# Patient Record
Sex: FEMALE | Race: WHITE | NOT HISPANIC OR LATINO | Employment: FULL TIME | URBAN - METROPOLITAN AREA
[De-identification: names, ages, dates, MRNs, and addresses within clinical notes are randomized per-mention and may not be internally consistent; named-entity substitution may affect disease eponyms.]

---

## 2022-03-18 PROBLEM — R60.0 EDEMA OF BOTH LOWER EXTREMITIES: Status: ACTIVE | Noted: 2022-03-18

## 2022-03-18 PROBLEM — IMO0002 TYPE II DIABETES MELLITUS, UNCONTROLLED: Status: ACTIVE | Noted: 2022-03-18

## 2022-03-18 PROBLEM — I10 ESSENTIAL HYPERTENSION, BENIGN: Status: ACTIVE | Noted: 2022-03-18

## 2022-03-18 PROBLEM — E78.00 HYPERCHOLESTEROLEMIA: Status: ACTIVE | Noted: 2022-03-18

## 2022-03-18 PROBLEM — E55.9 VITAMIN D DEFICIENCY: Status: ACTIVE | Noted: 2022-03-18

## 2022-03-31 ENCOUNTER — TELEPHONE (OUTPATIENT)
Dept: BARIATRICS/WEIGHT MGMT | Facility: CLINIC | Age: 37
End: 2022-03-31

## 2022-04-04 PROBLEM — IMO0002 TYPE II DIABETES MELLITUS, UNCONTROLLED: Status: RESOLVED | Noted: 2022-03-18 | Resolved: 2022-04-04

## 2022-04-04 PROBLEM — E11.65 TYPE 2 DIABETES MELLITUS WITH HYPERGLYCEMIA: Status: ACTIVE | Noted: 2022-04-04

## 2022-04-04 NOTE — PROGRESS NOTES
Sienna Jamie, 36 y.o., Gender: female    Assessment/Plan    1.  Pt with DM Type 2 uncontrolled w/o complications.  A1c 11.1% 3/22.  Counseled that conservative goal A1c is to be <7 % and aggressive 6.5 %.  Counseled pt that overall risk with diabetes is related to long term complications of both small and large blood vessels and that the risk for CAD, MI, and stroke is much higher than general population.  Counseled pt on long term effects of prolonged poor control of diabetes.  Counseled that the goal of tx is prevention of further complications.  Counseled on lifestyle change as a key part of this process to improve all parameters related to diabetes.  Pt counseled on how to do freq bg checks with fasting AM, before meals, before bed, occasionally 2 hours after a meal, and at 2-3 am if awake.  Pt is not checking enough as directed based on current treatment plan to check 4-6 times a day.  Pt says lost last meter some time ago.  Sent for new meter and will also try for Margie device per pt request.  Pt reports did not tolerate Metformin or GLP-1 class of tx.  Pt has never had SGLT2 in the past.  Will stop Glucotrol.  Rec Actos at 30 mg as that has been rec max dose since around '10.  Pt counseled to start Lantus at 15 units every morning and was given forced titration sheet to self adjust to get to goal of fasting blood glucose <130 mg/dL.  Pt was counseled on how to use short acting analog for correction coverage, meal, and bedtime coverage.  Also have a standing meal dose of 5,5,5 units.  Will get labs to assess pancreatic reserve which will help determine further tx approach.  Will get further diet education. Will have f/u labs before pt returns.  Counseled must keep log and bring to apts to cont tx plan.  Will work to optimize treatment plan in coming months and get back to PCP.  2.  Counseled on hypoglycemia as pt is on insulin.  Counseled about need to monitor bg related to driving as public safety concern.   Also counseled on proper use of glucagon emergency kit.  Pt reported full understanding.  3.  Hypertension goal of <140/90.  Pt on tx for renal protection.  Last microalbumin/creatinine ratio urine check not known so will get with next labs.  4.  Hyperlipidemia LDL goal <100 and trig goal <150.  Pt on tx and as no lab data provided rec cont pending lab update.  Will have f/u labs.    j  5.  Pt w/ hx of Vit D def.  No lab provided will update prior to return.  6.  Counseled to have eye exam updated as is past due.        Time Counseled: 25  Minutes  Total Time: 45  Minutes    Return to office in:  3-4   Months      Random Glucose: 182 mg/dL      HPI Summary    Pt is here for evaluation of DM reported to be Type 2.  Pt reports DM since '13 with 2nd pregnancy requiring insulin then got off insulin after delivery and has been on other tx since that time.  Pt reports blood glucoses are not known without record as lost meter.  Pt reports weight has been stable.  Pt has report of fatigue.  Pt has complaint of general muscle weakness.  Pt reports increased thirst and urination.  Pt denies any chest pain.  Pt reports shortness of breath w/ activity.  Pt denies any abdominal complaints at this time, but that was issue with prior tx items for DM.  Pt reports when eats she gets GI upset at times.  Pt reports occ blurry vision.  Pt reports last seen by eye doctor 12/20 no retinopathy.  Pt reports no problems with feet.  Pt denies any skin wounds.  Pt has report of depression and other mood disorder is on tx.  Pt reports poor sleep quality with known apnea symptoms on cpap.  Pt also maintains abnormal wake / sleep cycle related to working nights.  Pt reports trying to follow a diet to lose weight and no exercise.  Pt reports having pneumonia vaccine in the past and flu shot this last season.  Last education class was around time of dx pt asked for further edu on diet.  Pt without any other complaints related to diabetes at this  time.    Review of Systems  see HPI      Patient History    Past History (reviewed):    Medical:   Past Medical History:   Diagnosis Date   • ADHD (attention deficit hyperactivity disorder)    • Anxiety    • Bowel dysfunction     freq diarrhea   • Chronic fatigue    • Depression    • Edema of both lower extremities    • Endometriosis    • Essential hypertension, benign    • Gestational diabetes 2010    with 1st pregnancy, diet tx only   • Insomnia    • Migraine    • Mixed hyperlipidemia    • Morbidly obese (HCC)    • OCD (obsessive compulsive disorder)    • SERENA (obstructive sleep apnea)    • Ovarian cyst    • Reactive airway disease    • Type 2 diabetes mellitus with hyperglycemia (HCC) 2013    with 2nd pregnancy, insulin required   • Uterine fibroid    • Vitamin D deficiency        Surgery:   Past Surgical History:   Procedure Laterality Date   • CHOLECYSTECTOMY     • CYST REMOVAL      from chin area   • FULGURATION ENDOMETRIOSIS     • OVARIAN CYST REMOVAL     • UTERINE FIBROID SURGERY     • WISDOM TOOTH EXTRACTION           Social History (reviewed):  - Smoking, very rare ETOH, - Drugs,  3 children, 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    Allergies:    Allergies   Allergen Reactions   • Lisinopril Other (See Comments)     Patient states that she experiences heart palpations    • Penicillins Hives     Patient states that she gets hives    • Promethazine Nausea And Vomiting     Patient states that she experiences increased vomiting        Physical Exam    VITALS:    Vitals:    04/11/22 0906   BP: 141/69   Pulse: 75   Temp: 97.3 °F (36.3 °C)   SpO2: 97%     Body mass index is 51.15 kg/m².    GENERAL:  Looks stated age, Sig morbidly obese  HEAD/EYES:  N/C, A/T, Mask in place  EXTREMITIES:  FROM  CNS:  A&Ox3, normal sensation to fine touch    Full exam not done  today.      Labs/Imaging  All available lab and imaging data were reviewed.        Israel Malone MD, JHON, FACE, [facility]  4/11/2022  09:54 EDT

## 2022-04-11 ENCOUNTER — OFFICE VISIT (OUTPATIENT)
Dept: ENDOCRINOLOGY | Age: 37
End: 2022-04-11

## 2022-04-11 VITALS
WEIGHT: 293 LBS | HEIGHT: 64 IN | OXYGEN SATURATION: 97 % | HEART RATE: 75 BPM | BODY MASS INDEX: 50.02 KG/M2 | TEMPERATURE: 97.3 F | DIASTOLIC BLOOD PRESSURE: 69 MMHG | SYSTOLIC BLOOD PRESSURE: 141 MMHG

## 2022-04-11 DIAGNOSIS — E11.65 TYPE 2 DIABETES MELLITUS WITH HYPERGLYCEMIA, UNSPECIFIED WHETHER LONG TERM INSULIN USE: ICD-10-CM

## 2022-04-11 DIAGNOSIS — E55.9 VITAMIN D DEFICIENCY: ICD-10-CM

## 2022-04-11 DIAGNOSIS — I10 ESSENTIAL HYPERTENSION, BENIGN: ICD-10-CM

## 2022-04-11 DIAGNOSIS — E11.65 TYPE 2 DIABETES MELLITUS WITH HYPERGLYCEMIA, UNSPECIFIED WHETHER LONG TERM INSULIN USE: Primary | ICD-10-CM

## 2022-04-11 DIAGNOSIS — E78.2 MIXED HYPERLIPIDEMIA: ICD-10-CM

## 2022-04-11 PROBLEM — F32.A DEPRESSION: Status: ACTIVE | Noted: 2022-04-11

## 2022-04-11 PROBLEM — G47.00 INSOMNIA: Status: ACTIVE | Noted: 2022-04-11

## 2022-04-11 PROBLEM — G43.009 MIGRAINE WITHOUT AURA: Status: ACTIVE | Noted: 2021-10-28

## 2022-04-11 PROBLEM — E78.00 HYPERCHOLESTEROLEMIA: Status: RESOLVED | Noted: 2022-03-18 | Resolved: 2022-04-11

## 2022-04-11 PROBLEM — F41.9 ANXIETY: Status: ACTIVE | Noted: 2022-04-11

## 2022-04-11 PROBLEM — G47.33 OSA (OBSTRUCTIVE SLEEP APNEA): Status: ACTIVE | Noted: 2021-05-25

## 2022-04-11 PROBLEM — F42.9 OBSESSIVE-COMPULSIVE DISORDER: Status: ACTIVE | Noted: 2021-05-25

## 2022-04-11 LAB — GLUCOSE BLDC GLUCOMTR-MCNC: 182 MG/DL (ref 70–130)

## 2022-04-11 PROCEDURE — 99204 OFFICE O/P NEW MOD 45 MIN: CPT | Performed by: INTERNAL MEDICINE

## 2022-04-11 PROCEDURE — 82962 GLUCOSE BLOOD TEST: CPT | Performed by: INTERNAL MEDICINE

## 2022-04-11 RX ORDER — INSULIN LISPRO 100 [IU]/ML
INJECTION, SOLUTION INTRAVENOUS; SUBCUTANEOUS
Qty: 45 ML | Refills: 2 | Status: SHIPPED | OUTPATIENT
Start: 2022-04-11 | End: 2022-04-11 | Stop reason: SDUPTHER

## 2022-04-11 RX ORDER — FENOFIBRATE 145 MG/1
145 TABLET, COATED ORAL DAILY
COMMUNITY

## 2022-04-11 RX ORDER — OXYBUTYNIN CHLORIDE 15 MG/1
15 TABLET, EXTENDED RELEASE ORAL DAILY
COMMUNITY
Start: 2022-03-24

## 2022-04-11 RX ORDER — BUSPIRONE HYDROCHLORIDE 10 MG/1
10 TABLET ORAL 2 TIMES DAILY
COMMUNITY

## 2022-04-11 RX ORDER — PEN NEEDLE, DIABETIC 31 GX5/16"
NEEDLE, DISPOSABLE MISCELLANEOUS
Qty: 500 EACH | Refills: 4 | Status: SHIPPED | OUTPATIENT
Start: 2022-04-11 | End: 2023-01-03

## 2022-04-11 RX ORDER — CLONAZEPAM 0.5 MG/1
TABLET ORAL
COMMUNITY
End: 2023-01-03

## 2022-04-11 RX ORDER — OMEPRAZOLE 10 MG/1
CAPSULE, DELAYED RELEASE ORAL
COMMUNITY

## 2022-04-11 RX ORDER — RIMEGEPANT SULFATE 75 MG/75MG
TABLET, ORALLY DISINTEGRATING ORAL
COMMUNITY
End: 2023-01-03

## 2022-04-11 RX ORDER — BLOOD SUGAR DIAGNOSTIC
STRIP MISCELLANEOUS
Qty: 500 EACH | Refills: 4 | Status: SHIPPED | OUTPATIENT
Start: 2022-04-11 | End: 2023-01-03

## 2022-04-11 RX ORDER — HYDROCODONE BITARTRATE AND ACETAMINOPHEN 7.5; 325 MG/1; MG/1
1 TABLET ORAL EVERY 6 HOURS
COMMUNITY
Start: 2022-03-03

## 2022-04-11 RX ORDER — PROCHLORPERAZINE MALEATE 10 MG
TABLET ORAL
COMMUNITY
End: 2023-01-03

## 2022-04-11 RX ORDER — LANCETS 30 GAUGE
1 EACH MISCELLANEOUS
Qty: 500 EACH | Refills: 4 | Status: SHIPPED | OUTPATIENT
Start: 2022-04-11

## 2022-04-11 RX ORDER — LOSARTAN POTASSIUM 50 MG/1
50 TABLET ORAL DAILY
COMMUNITY

## 2022-04-11 RX ORDER — GABAPENTIN 300 MG/1
300 CAPSULE ORAL 3 TIMES DAILY
COMMUNITY

## 2022-04-11 RX ORDER — ALBUTEROL SULFATE 90 UG/1
AEROSOL, METERED RESPIRATORY (INHALATION)
COMMUNITY
End: 2023-01-03

## 2022-04-11 RX ORDER — PRAZOSIN HYDROCHLORIDE 2 MG/1
2 CAPSULE ORAL NIGHTLY
COMMUNITY

## 2022-04-11 RX ORDER — MELOXICAM 15 MG/1
TABLET ORAL
COMMUNITY
End: 2023-01-03

## 2022-04-11 RX ORDER — SUMATRIPTAN 100 MG/1
100 TABLET, FILM COATED ORAL AS NEEDED
COMMUNITY
Start: 2022-03-30

## 2022-04-11 RX ORDER — GLIPIZIDE 10 MG/1
TABLET, FILM COATED, EXTENDED RELEASE ORAL
COMMUNITY
End: 2022-04-11

## 2022-04-11 RX ORDER — INSULIN LISPRO 100 [IU]/ML
INJECTION, SOLUTION INTRAVENOUS; SUBCUTANEOUS
Qty: 45 ML | Refills: 2 | Status: SHIPPED | OUTPATIENT
Start: 2022-04-11

## 2022-04-11 RX ORDER — POTASSIUM CHLORIDE 750 MG/1
10 CAPSULE, EXTENDED RELEASE ORAL 2 TIMES DAILY
COMMUNITY
Start: 2022-03-31 | End: 2023-01-03

## 2022-04-11 RX ORDER — MODAFINIL 200 MG/1
200 TABLET ORAL DAILY
COMMUNITY

## 2022-04-11 RX ORDER — PIOGLITAZONEHYDROCHLORIDE 30 MG/1
TABLET ORAL
Qty: 90 TABLET | Refills: 2 | Status: SHIPPED | OUTPATIENT
Start: 2022-04-11

## 2022-04-11 RX ORDER — DIPHENOXYLATE HYDROCHLORIDE AND ATROPINE SULFATE 2.5; .025 MG/1; MG/1
TABLET ORAL
COMMUNITY
End: 2023-01-03

## 2022-04-11 RX ORDER — PIOGLITAZONEHYDROCHLORIDE 45 MG/1
45 TABLET ORAL DAILY
COMMUNITY
Start: 2022-03-26 | End: 2022-04-11 | Stop reason: DRUGHIGH

## 2022-04-11 RX ORDER — MAGNESIUM OXIDE 400 MG/1
400 TABLET ORAL DAILY
COMMUNITY

## 2022-04-11 RX ORDER — PEN NEEDLE, DIABETIC 31 GX5/16"
NEEDLE, DISPOSABLE MISCELLANEOUS
Qty: 500 EACH | Refills: 4 | Status: SHIPPED | OUTPATIENT
Start: 2022-04-11 | End: 2022-04-11 | Stop reason: SDUPTHER

## 2022-04-11 RX ORDER — BLOOD-GLUCOSE METER
1 KIT MISCELLANEOUS ONCE
Qty: 1 KIT | Refills: 0 | Status: SHIPPED | OUTPATIENT
Start: 2022-04-11 | End: 2022-04-11

## 2022-04-11 RX ORDER — AMOXICILLIN 250 MG
CAPSULE ORAL
COMMUNITY
End: 2023-01-03

## 2022-04-11 RX ORDER — ERGOCALCIFEROL 1.25 MG/1
50000 CAPSULE ORAL WEEKLY
COMMUNITY

## 2022-04-11 RX ORDER — GLUCAGON 3 MG/1
3 POWDER NASAL AS NEEDED
Qty: 1 EACH | Refills: 6 | Status: SHIPPED | OUTPATIENT
Start: 2022-04-11

## 2022-04-11 RX ORDER — BISMUTH SUBSALICYLATE 262MG/15ML
SUSPENSION, ORAL (FINAL DOSE FORM) ORAL
COMMUNITY

## 2022-04-11 RX ORDER — ONDANSETRON 8 MG/1
8 TABLET, ORALLY DISINTEGRATING ORAL EVERY 8 HOURS PRN
COMMUNITY
Start: 2022-03-28

## 2022-04-11 RX ORDER — METOPROLOL SUCCINATE 100 MG/1
TABLET, EXTENDED RELEASE ORAL
COMMUNITY

## 2022-04-11 RX ORDER — BUPROPION HYDROCHLORIDE 150 MG/1
TABLET, EXTENDED RELEASE ORAL EVERY 12 HOURS SCHEDULED
COMMUNITY

## 2022-04-11 RX ORDER — INSULIN GLARGINE 100 [IU]/ML
INJECTION, SOLUTION SUBCUTANEOUS
Qty: 30 ML | Refills: 2 | Status: SHIPPED | OUTPATIENT
Start: 2022-04-11 | End: 2023-01-03

## 2022-04-13 ENCOUNTER — PATIENT ROUNDING (BHMG ONLY) (OUTPATIENT)
Dept: ENDOCRINOLOGY | Age: 37
End: 2022-04-13

## 2022-04-15 ENCOUNTER — TELEPHONE (OUTPATIENT)
Dept: ENDOCRINOLOGY | Age: 37
End: 2022-04-15

## 2022-04-15 NOTE — TELEPHONE ENCOUNTER
[facility] sent a request for an alternative for Lispro insulin please refer to fax in media tab for more info

## 2023-01-02 ENCOUNTER — APPOINTMENT (OUTPATIENT)
Dept: GENERAL RADIOLOGY | Facility: HOSPITAL | Age: 38
End: 2023-01-02
Payer: COMMERCIAL

## 2023-01-02 ENCOUNTER — HOSPITAL ENCOUNTER (OUTPATIENT)
Facility: HOSPITAL | Age: 38
Setting detail: OBSERVATION
Discharge: HOME | End: 2023-01-03
Attending: EMERGENCY MEDICINE | Admitting: HOSPITALIST
Payer: COMMERCIAL

## 2023-01-02 DIAGNOSIS — R07.9 CHEST PAIN, UNSPECIFIED TYPE: Primary | ICD-10-CM

## 2023-01-02 DIAGNOSIS — R06.02 SHORTNESS OF BREATH: ICD-10-CM

## 2023-01-02 LAB
ALBUMIN SERPL-MCNC: 3.7 G/DL (ref 3.5–5.2)
ALBUMIN/GLOB SERPL: 1.5 G/DL
ALP SERPL-CCNC: 52 U/L (ref 39–117)
ALT SERPL W P-5'-P-CCNC: 12 U/L (ref 1–33)
AMPHET+METHAMPHET UR QL: POSITIVE
ANION GAP SERPL CALCULATED.3IONS-SCNC: 6 MMOL/L (ref 5–15)
AST SERPL-CCNC: 10 U/L (ref 1–32)
BARBITURATES UR QL SCN: NEGATIVE
BASOPHILS # BLD AUTO: 0 10*3/MM3 (ref 0–0.2)
BASOPHILS NFR BLD AUTO: 0.4 % (ref 0–1.5)
BENZODIAZ UR QL SCN: NEGATIVE
BILIRUB SERPL-MCNC: 0.2 MG/DL (ref 0–1.2)
BUN SERPL-MCNC: 17 MG/DL (ref 6–20)
BUN/CREAT SERPL: 20.2 (ref 7–25)
CALCIUM SPEC-SCNC: 8.7 MG/DL (ref 8.6–10.5)
CANNABINOIDS SERPL QL: NEGATIVE
CHLORIDE SERPL-SCNC: 104 MMOL/L (ref 98–107)
CO2 SERPL-SCNC: 27 MMOL/L (ref 22–29)
COCAINE UR QL: NEGATIVE
CREAT SERPL-MCNC: 0.84 MG/DL (ref 0.57–1)
D DIMER PPP FEU-MCNC: 0.33 MG/L (FEU) (ref 0–0.5)
DEPRECATED RDW RBC AUTO: 45.5 FL (ref 37–54)
EGFRCR SERPLBLD CKD-EPI 2021: 91.3 ML/MIN/1.73
EOSINOPHIL # BLD AUTO: 0.1 10*3/MM3 (ref 0–0.4)
EOSINOPHIL NFR BLD AUTO: 2 % (ref 0.3–6.2)
ERYTHROCYTE [DISTWIDTH] IN BLOOD BY AUTOMATED COUNT: 14.4 % (ref 12.3–15.4)
GLOBULIN UR ELPH-MCNC: 2.5 GM/DL
GLUCOSE BLDC GLUCOMTR-MCNC: 216 MG/DL (ref 70–105)
GLUCOSE SERPL-MCNC: 220 MG/DL (ref 65–99)
HCG SERPL QL: NEGATIVE
HCT VFR BLD AUTO: 34.5 % (ref 34–46.6)
HGB BLD-MCNC: 10.8 G/DL (ref 12–15.9)
LYMPHOCYTES # BLD AUTO: 1.1 10*3/MM3 (ref 0.7–3.1)
LYMPHOCYTES NFR BLD AUTO: 15.6 % (ref 19.6–45.3)
MCH RBC QN AUTO: 28.4 PG (ref 26.6–33)
MCHC RBC AUTO-ENTMCNC: 31.4 G/DL (ref 31.5–35.7)
MCV RBC AUTO: 90.5 FL (ref 79–97)
METHADONE UR QL SCN: NEGATIVE
MONOCYTES # BLD AUTO: 0.5 10*3/MM3 (ref 0.1–0.9)
MONOCYTES NFR BLD AUTO: 6.6 % (ref 5–12)
NEUTROPHILS NFR BLD AUTO: 5.2 10*3/MM3 (ref 1.7–7)
NEUTROPHILS NFR BLD AUTO: 75.4 % (ref 42.7–76)
NRBC BLD AUTO-RTO: 0.1 /100 WBC (ref 0–0.2)
OPIATES UR QL: NEGATIVE
OXYCODONE UR QL SCN: NEGATIVE
PLATELET # BLD AUTO: 284 10*3/MM3 (ref 140–450)
PMV BLD AUTO: 7.3 FL (ref 6–12)
POTASSIUM SERPL-SCNC: 4.5 MMOL/L (ref 3.5–5.2)
PROT SERPL-MCNC: 6.2 G/DL (ref 6–8.5)
RBC # BLD AUTO: 3.81 10*6/MM3 (ref 3.77–5.28)
SODIUM SERPL-SCNC: 137 MMOL/L (ref 136–145)
TROPONIN T SERPL-MCNC: <0.01 NG/ML (ref 0–0.03)
WBC NRBC COR # BLD: 6.9 10*3/MM3 (ref 3.4–10.8)

## 2023-01-02 PROCEDURE — 84703 CHORIONIC GONADOTROPIN ASSAY: CPT | Performed by: PHYSICIAN ASSISTANT

## 2023-01-02 PROCEDURE — 80307 DRUG TEST PRSMV CHEM ANLYZR: CPT | Performed by: PHYSICIAN ASSISTANT

## 2023-01-02 PROCEDURE — 93005 ELECTROCARDIOGRAM TRACING: CPT | Performed by: EMERGENCY MEDICINE

## 2023-01-02 PROCEDURE — G0378 HOSPITAL OBSERVATION PER HR: HCPCS

## 2023-01-02 PROCEDURE — 85379 FIBRIN DEGRADATION QUANT: CPT | Performed by: PHYSICIAN ASSISTANT

## 2023-01-02 PROCEDURE — 71045 X-RAY EXAM CHEST 1 VIEW: CPT

## 2023-01-02 PROCEDURE — 80053 COMPREHEN METABOLIC PANEL: CPT | Performed by: PHYSICIAN ASSISTANT

## 2023-01-02 PROCEDURE — 85025 COMPLETE CBC W/AUTO DIFF WBC: CPT | Performed by: PHYSICIAN ASSISTANT

## 2023-01-02 PROCEDURE — 36415 COLL VENOUS BLD VENIPUNCTURE: CPT

## 2023-01-02 PROCEDURE — 82962 GLUCOSE BLOOD TEST: CPT

## 2023-01-02 PROCEDURE — 84484 ASSAY OF TROPONIN QUANT: CPT | Performed by: PHYSICIAN ASSISTANT

## 2023-01-02 PROCEDURE — 93005 ELECTROCARDIOGRAM TRACING: CPT

## 2023-01-02 RX ORDER — SODIUM CHLORIDE 0.9 % (FLUSH) 0.9 %
10 SYRINGE (ML) INJECTION AS NEEDED
Status: DISCONTINUED | OUTPATIENT
Start: 2023-01-02 | End: 2023-01-03 | Stop reason: HOSPADM

## 2023-01-02 RX ORDER — ASPIRIN 81 MG/1
324 TABLET, CHEWABLE ORAL ONCE
Status: COMPLETED | OUTPATIENT
Start: 2023-01-02 | End: 2023-01-02

## 2023-01-02 RX ADMIN — ASPIRIN 324 MG: 81 TABLET, CHEWABLE ORAL at 21:10

## 2023-01-02 NOTE — Clinical Note
Level of Care: Telemetry [5]   Diagnosis: Chest pain, unspecified type [[id number]]   Admitting Physician: JOSEPH PARSONS [[id number]]   Attending Physician: JOSEPH PARSONS [[id number]]

## 2023-01-03 ENCOUNTER — APPOINTMENT (OUTPATIENT)
Dept: NUCLEAR MEDICINE | Facility: HOSPITAL | Age: 38
End: 2023-01-03
Payer: COMMERCIAL

## 2023-01-03 VITALS
HEART RATE: 69 BPM | DIASTOLIC BLOOD PRESSURE: 61 MMHG | SYSTOLIC BLOOD PRESSURE: 119 MMHG | RESPIRATION RATE: 16 BRPM | TEMPERATURE: 98.5 F | WEIGHT: 293 LBS | BODY MASS INDEX: 50.02 KG/M2 | HEIGHT: 64 IN | OXYGEN SATURATION: 97 %

## 2023-01-03 PROBLEM — R07.9 CHEST PAIN, UNSPECIFIED TYPE: Status: ACTIVE | Noted: 2023-01-03

## 2023-01-03 LAB
ANION GAP SERPL CALCULATED.3IONS-SCNC: 7 MMOL/L (ref 5–15)
BASOPHILS # BLD AUTO: 0 10*3/MM3 (ref 0–0.2)
BASOPHILS NFR BLD AUTO: 0.3 % (ref 0–1.5)
BH CV NUCLEAR PRIOR STUDY: 3
BH CV REST NUCLEAR ISOTOPE DOSE: 11 MCI
BH CV STRESS BP STAGE 1: NORMAL
BH CV STRESS DURATION MIN STAGE 1: 3
BH CV STRESS DURATION SEC STAGE 1: 0
BH CV STRESS GRADE STAGE 1: 10
BH CV STRESS HR STAGE 1: 155
BH CV STRESS METS STAGE 1: 5
BH CV STRESS NUCLEAR ISOTOPE DOSE: 33 MCI
BH CV STRESS PROTOCOL 1: NORMAL
BH CV STRESS RECOVERY BP: NORMAL MMHG
BH CV STRESS RECOVERY HR: 95 BPM
BH CV STRESS SPEED STAGE 1: 1.7
BH CV STRESS STAGE 1: 1
BUN SERPL-MCNC: 15 MG/DL (ref 6–20)
BUN/CREAT SERPL: 19 (ref 7–25)
CALCIUM SPEC-SCNC: 9 MG/DL (ref 8.6–10.5)
CHLORIDE SERPL-SCNC: 104 MMOL/L (ref 98–107)
CO2 SERPL-SCNC: 28 MMOL/L (ref 22–29)
CREAT SERPL-MCNC: 0.79 MG/DL (ref 0.57–1)
DEPRECATED RDW RBC AUTO: 44.6 FL (ref 37–54)
EGFRCR SERPLBLD CKD-EPI 2021: 98.3 ML/MIN/1.73
EOSINOPHIL # BLD AUTO: 0.2 10*3/MM3 (ref 0–0.4)
EOSINOPHIL NFR BLD AUTO: 2.5 % (ref 0.3–6.2)
ERYTHROCYTE [DISTWIDTH] IN BLOOD BY AUTOMATED COUNT: 14.3 % (ref 12.3–15.4)
GLUCOSE BLDC GLUCOMTR-MCNC: 111 MG/DL (ref 70–105)
GLUCOSE BLDC GLUCOMTR-MCNC: 90 MG/DL (ref 70–105)
GLUCOSE SERPL-MCNC: 127 MG/DL (ref 65–99)
HCT VFR BLD AUTO: 33.5 % (ref 34–46.6)
HGB BLD-MCNC: 10.7 G/DL (ref 12–15.9)
LV EF NUC BP: 74 %
LYMPHOCYTES # BLD AUTO: 1.5 10*3/MM3 (ref 0.7–3.1)
LYMPHOCYTES NFR BLD AUTO: 24.5 % (ref 19.6–45.3)
MAXIMAL PREDICTED HEART RATE: 182 BPM
MCH RBC QN AUTO: 28.6 PG (ref 26.6–33)
MCHC RBC AUTO-ENTMCNC: 31.9 G/DL (ref 31.5–35.7)
MCV RBC AUTO: 89.6 FL (ref 79–97)
MONOCYTES # BLD AUTO: 0.5 10*3/MM3 (ref 0.1–0.9)
MONOCYTES NFR BLD AUTO: 7.5 % (ref 5–12)
NEUTROPHILS NFR BLD AUTO: 4 10*3/MM3 (ref 1.7–7)
NEUTROPHILS NFR BLD AUTO: 65.2 % (ref 42.7–76)
NRBC BLD AUTO-RTO: 0 /100 WBC (ref 0–0.2)
PERCENT MAX PREDICTED HR: 85.16 %
PLATELET # BLD AUTO: 283 10*3/MM3 (ref 140–450)
PMV BLD AUTO: 7.3 FL (ref 6–12)
POTASSIUM SERPL-SCNC: 4.1 MMOL/L (ref 3.5–5.2)
RBC # BLD AUTO: 3.74 10*6/MM3 (ref 3.77–5.28)
SODIUM SERPL-SCNC: 139 MMOL/L (ref 136–145)
STRESS BASELINE BP: NORMAL MMHG
STRESS BASELINE HR: 98 BPM
STRESS PERCENT HR: 100 %
STRESS POST ESTIMATED WORKLOAD: 4.6 METS
STRESS POST EXERCISE DUR MIN: 6 MIN
STRESS POST EXERCISE DUR SEC: 1 SEC
STRESS POST PEAK BP: NORMAL MMHG
STRESS POST PEAK HR: 155 BPM
STRESS TARGET HR: 155 BPM
TROPONIN T SERPL-MCNC: <0.01 NG/ML (ref 0–0.03)
WBC NRBC COR # BLD: 6.2 10*3/MM3 (ref 3.4–10.8)

## 2023-01-03 PROCEDURE — 0 TECHNETIUM TETROFOSMIN KIT: Performed by: HOSPITALIST

## 2023-01-03 PROCEDURE — 93018 CV STRESS TEST I&R ONLY: CPT | Performed by: INTERNAL MEDICINE

## 2023-01-03 PROCEDURE — 78452 HT MUSCLE IMAGE SPECT MULT: CPT

## 2023-01-03 PROCEDURE — G0378 HOSPITAL OBSERVATION PER HR: HCPCS

## 2023-01-03 PROCEDURE — 99284 EMERGENCY DEPT VISIT MOD MDM: CPT

## 2023-01-03 PROCEDURE — 78452 HT MUSCLE IMAGE SPECT MULT: CPT | Performed by: INTERNAL MEDICINE

## 2023-01-03 PROCEDURE — 82962 GLUCOSE BLOOD TEST: CPT

## 2023-01-03 PROCEDURE — 85025 COMPLETE CBC W/AUTO DIFF WBC: CPT | Performed by: NURSE PRACTITIONER

## 2023-01-03 PROCEDURE — A9502 TC99M TETROFOSMIN: HCPCS | Performed by: HOSPITALIST

## 2023-01-03 PROCEDURE — 84484 ASSAY OF TROPONIN QUANT: CPT | Performed by: NURSE PRACTITIONER

## 2023-01-03 PROCEDURE — 80048 BASIC METABOLIC PNL TOTAL CA: CPT | Performed by: NURSE PRACTITIONER

## 2023-01-03 PROCEDURE — 93017 CV STRESS TEST TRACING ONLY: CPT

## 2023-01-03 PROCEDURE — 96374 THER/PROPH/DIAG INJ IV PUSH: CPT

## 2023-01-03 RX ORDER — DEXTROSE MONOHYDRATE 25 G/50ML
25 INJECTION, SOLUTION INTRAVENOUS
Status: DISCONTINUED | OUTPATIENT
Start: 2023-01-03 | End: 2023-01-03 | Stop reason: HOSPADM

## 2023-01-03 RX ORDER — INSULIN LISPRO 100 [IU]/ML
2-9 INJECTION, SOLUTION INTRAVENOUS; SUBCUTANEOUS EVERY 6 HOURS SCHEDULED
Status: DISCONTINUED | OUTPATIENT
Start: 2023-01-03 | End: 2023-01-03 | Stop reason: HOSPADM

## 2023-01-03 RX ORDER — SODIUM CHLORIDE 0.9 % (FLUSH) 0.9 %
10 SYRINGE (ML) INJECTION EVERY 12 HOURS SCHEDULED
Status: DISCONTINUED | OUTPATIENT
Start: 2023-01-03 | End: 2023-01-03 | Stop reason: HOSPADM

## 2023-01-03 RX ORDER — SODIUM CHLORIDE 9 MG/ML
40 INJECTION, SOLUTION INTRAVENOUS AS NEEDED
Status: DISCONTINUED | OUTPATIENT
Start: 2023-01-03 | End: 2023-01-03 | Stop reason: SDUPTHER

## 2023-01-03 RX ORDER — NITROGLYCERIN 0.4 MG/1
0.4 TABLET SUBLINGUAL
Status: DISCONTINUED | OUTPATIENT
Start: 2023-01-03 | End: 2023-01-03 | Stop reason: HOSPADM

## 2023-01-03 RX ORDER — OLANZAPINE 10 MG/2ML
1 INJECTION, POWDER, LYOPHILIZED, FOR SOLUTION INTRAMUSCULAR
Status: DISCONTINUED | OUTPATIENT
Start: 2023-01-03 | End: 2023-01-03 | Stop reason: HOSPADM

## 2023-01-03 RX ORDER — SODIUM CHLORIDE 0.9 % (FLUSH) 0.9 %
10 SYRINGE (ML) INJECTION AS NEEDED
Status: DISCONTINUED | OUTPATIENT
Start: 2023-01-03 | End: 2023-01-03 | Stop reason: HOSPADM

## 2023-01-03 RX ORDER — NICOTINE POLACRILEX 4 MG
15 LOZENGE BUCCAL
Status: DISCONTINUED | OUTPATIENT
Start: 2023-01-03 | End: 2023-01-03 | Stop reason: HOSPADM

## 2023-01-03 RX ORDER — CLONAZEPAM 0.5 MG/1
0.5 TABLET ORAL 2 TIMES DAILY PRN
COMMUNITY

## 2023-01-03 RX ORDER — INSULIN GLARGINE 100 [IU]/ML
30 INJECTION, SOLUTION SUBCUTANEOUS EVERY MORNING
COMMUNITY

## 2023-01-03 RX ADMIN — TETROFOSMIN 1 DOSE: 1.38 INJECTION, POWDER, LYOPHILIZED, FOR SOLUTION INTRAVENOUS at 09:00

## 2023-01-03 RX ADMIN — Medication 10 ML: at 02:15

## 2023-01-03 RX ADMIN — TETROFOSMIN 1 DOSE: 1.38 INJECTION, POWDER, LYOPHILIZED, FOR SOLUTION INTRAVENOUS at 10:14

## 2023-01-03 RX ADMIN — Medication 10 ML: at 09:21

## 2023-01-03 NOTE — ED PROVIDER NOTES
Subjective       Patient is a 37-year-old female comes in complaining of chest pain since earlier this evening.  Patient states pain is in the left side of her chest that will come and go.  Patient states sometimes it is exertional other times not.  Patient's describes pain radiating down her left arm with some tingling as well as into her left neck.  Patient states she did start a new workout activity at the [facility] today but states that her pain is not sore in nature.  Patient reports some associated shortness of breath as well.  Patient reports a family history of heart disease, personal history of morbid obesity, hyperlipidemia, hypertension and diabetes as well as ADHD.  Patient denies any chest pain currently.  Patient denies any fever, chills, cough, congestion, swelling of the legs bilaterally.        Review of Systems   Constitutional: Negative for chills, fatigue and fever.   HENT: Negative for congestion, sore throat, tinnitus and trouble swallowing.    Eyes: Negative for photophobia, discharge and visual disturbance.   Respiratory: Positive for shortness of breath. Negative for cough and wheezing.    Cardiovascular: Positive for chest pain. Negative for leg swelling.   Gastrointestinal: Negative for abdominal pain, diarrhea, nausea and vomiting.   Genitourinary: Negative for dysuria, flank pain and urgency.   Musculoskeletal: Negative for arthralgias and myalgias.   Skin: Negative for rash.   Neurological: Negative for dizziness and headaches.   Psychiatric/Behavioral: Negative for confusion.       Past Medical History:   Diagnosis Date   • ADHD (attention deficit hyperactivity disorder)    • Anxiety    • Bowel dysfunction     freq diarrhea   • Chronic fatigue    • Depression    • Disorder of sleep-wake cycle     works night shift   • Edema of both lower extremities    • Endometriosis    • Essential hypertension, benign    • Gestational diabetes 2010    with 1st pregnancy, diet tx only   • Insomnia    •  Migraine    • Mixed hyperlipidemia    • Morbidly obese (HCC)    • OCD (obsessive compulsive disorder)    • SERENA (obstructive sleep apnea)    • Ovarian cyst    • Reactive airway disease    • Type 2 diabetes mellitus with hyperglycemia ([facility]) 2013    with 2nd pregnancy, insulin required   • Uterine fibroid    • Vitamin D deficiency        Allergies   Allergen Reactions   • Lisinopril Other (See Comments)     Patient states that she experiences heart palpations    • Penicillins Hives     Patient states that she gets hives    • Promethazine Nausea And Vomiting     Patient states that she experiences increased vomiting        Past Surgical History:   Procedure Laterality Date   • CHOLECYSTECTOMY     • CYST REMOVAL      from chin area   • FULGURATION ENDOMETRIOSIS     • OVARIAN CYST REMOVAL     • UTERINE FIBROID SURGERY     • WISDOM TOOTH EXTRACTION         No family history on file.    Social History     Socioeconomic History   • Marital status:    Substance and Sexual Activity   • Alcohol use: Not Currently           Objective   Physical Exam  Vitals and nursing note reviewed.   Constitutional:       General: She is not in acute distress.     Appearance: She is well-developed. She is not diaphoretic.   HENT:      Head: Normocephalic and atraumatic.      Nose: Nose normal.      Mouth/Throat:      Pharynx: No oropharyngeal exudate.   Eyes:      Extraocular Movements: Extraocular movements intact.      Conjunctiva/sclera: Conjunctivae normal.      Pupils: Pupils are equal, round, and reactive to light.   Cardiovascular:      Rate and Rhythm: Normal rate and regular rhythm.      Heart sounds: Normal heart sounds.      Comments: S1, S2 audible.  Pulmonary:      Effort: Pulmonary effort is normal. No respiratory distress.      Breath sounds: Normal breath sounds. No wheezing, rhonchi or rales.      Comments: On room air.  Abdominal:      General: Bowel sounds are normal. There is no distension.      Palpations: Abdomen  is soft.      Tenderness: There is no abdominal tenderness.   Musculoskeletal:         General: No tenderness or deformity. Normal range of motion.      Cervical back: Normal range of motion.      Right lower leg: No edema.      Left lower leg: No edema.   Skin:     General: Skin is warm.      Capillary Refill: Capillary refill takes less than 2 seconds.      Findings: No erythema or rash.   Neurological:      Mental Status: She is alert and oriented to person, place, and time.      Cranial Nerves: No cranial nerve deficit.   Psychiatric:         Mood and Affect: Mood normal.         Behavior: Behavior normal.         Procedures           ED Course      /60   Pulse 73   Temp 98.3 °F (36.8 °C)   Resp 20   Ht 162.6 cm (64\")   Wt (!) 156 kg (342 lb 14.4 oz)   LMP  (LMP Unknown)   SpO2 96%   BMI 58.86 kg/m²   Labs Reviewed   COMPREHENSIVE METABOLIC PANEL - Abnormal; Notable for the following components:       Result Value    Glucose 220 (*)     All other components within normal limits    Narrative:     GFR Normal >60  Chronic Kidney Disease <60  Kidney Failure <15     CBC WITH AUTO DIFFERENTIAL - Abnormal; Notable for the following components:    Hemoglobin 10.8 (*)     MCHC 31.4 (*)     Lymphocyte % 15.6 (*)     All other components within normal limits   URINE DRUG SCREEN - Abnormal; Notable for the following components:    Amphet/Methamphet, Screen Positive (*)     All other components within normal limits    Narrative:     Negative Thresholds Per Drugs Screened:    Amphetamines                 500 ng/ml  Barbiturates                 200 ng/ml  Benzodiazepines              100 ng/ml  Cocaine                      300 ng/ml  Methadone                    300 ng/ml  Opiates                      300 ng/ml  Oxycodone                    100 ng/ml  THC                           50 ng/ml    The Normal Value for all drugs tested is negative. This report includes final unconfirmed screening results to be used for  medical treatment purposes only. Unconfirmed results must not be used for non-medical purposes such as employment or legal testing. Clinical consideration should be applied to any drug of abuse test, particularly when unconfirmed results are used.          All urine drugs of abuse requests without chain of custody are for medical screening purposes only.  False positives are possible.     POCT GLUCOSE FINGERSTICK - Abnormal; Notable for the following components:    Glucose 216 (*)     All other components within normal limits   TROPONIN (IN-HOUSE) - Normal    Narrative:     Troponin T Reference Range:  <= 0.03 ng/mL-   Negative for AMI  >0.03 ng/mL-     Abnormal for myocardial necrosis.  Clinicians would have to utilize clinical acumen, EKG, Troponin and serial changes to determine if it is an Acute Myocardial Infarction or myocardial injury due to an underlying chronic condition.       Results may be falsely decreased if patient taking Biotin.     HCG, SERUM, QUALITATIVE - Normal   D-DIMER, QUANTITATIVE - Normal    Narrative:     According to the assay 's published package insert, a normal (<0.50 mg/L (FEU)) D-dimer result in conjunction with a non-high clinical probability assessment, excludes deep vein thrombosis (DVT) and pulmonary embolism (PE) with high sensitivity.    D-dimer values increase with age and this can make VTE exclusion of an older population difficult. To address this, the [employer] College [employer], based on best available evidence and recent guidelines, recommends that clinicians use age-adjusted D-dimer thresholds in patients greater than 50 years of age with: a) a low probability of PE who do not meet all Pulmonary Embolism Rule Out Criteria, or b) in those with intermediate probability of PE.   The formula for an age-adjusted D-dimer cut-off is \"age/100\".  For example, a 60 year old patient would have an age-adjusted cut-off of 0.60 mg/L (FEU) and an 80 year old 0.80 mg/L  (FEU).   POCT GLUCOSE FINGERSTICK   CBC AND DIFFERENTIAL    Narrative:     The following orders were created for panel order CBC & Differential.  Procedure                               Abnormality         Status                     ---------                               -----------         ------                     CBC Auto Differential[[id number]]        Abnormal            Final result                 Please view results for these tests on the individual orders.     XR Chest 1 View    Result Date: 1/2/2023  No acute process.  Electronically Signed By-Timothy Cook MD On:1/2/2023 9:40 PM This report was finalized on [id number] by  Timothy Cook MD.                                         MDM     DDX: Acute coronary syndrome, NSTEMI, pulmonary embolism, this is not an all-inclusive list of differential diagnosis  Chart review:  Allergies reviewed.  Patient takes Vyvanse for ADHD.  EKG: EKG reviewed by myself and interpreted by , shows sinus rhythm 77 bpm, no ST elevation apparent.    Imaging:  See above  Labs: CBC and CMP largely unremarkable for acute findings.  Initial troponin negative.  Serum hCG negative.  D-dimer negative.  Vitals:  /60   Pulse 73   Temp 98.3 °F (36.8 °C)   Resp 20   Ht 162.6 cm (64\")   Wt (!) 156 kg (342 lb 14.4 oz)   LMP  (LMP Unknown)   SpO2 96%   BMI 58.86 kg/m²     Medications given:    Medications   sodium chloride 0.9 % flush 10 mL (has no administration in time range)   aspirin chewable tablet 324 mg (324 mg Oral Given 1/2/23 2110)       Procedures:  Not indicated  MDM: Patient is a 37-year-old female comes in complaining of chest pain.  IV established.  CBC and CMP largely unremarkable for acute findings.  Initial troponin negative.  Serum hCG negative.  D-dimer negative and patient is PERC negative felt not to have PE at this time.  EKG shows no acute findings.  Chest x-ray unremarkable for acute findings.  Patient was given full dose aspirin here in the  ER.  Patient did not complain of any subsequent chest pain throughout ER stay.  Patient has a heart score of 4 and has significant risk factors including morbid obesity, history of diabetes, hypertension, hyperlipidemia as well as family history of heart disease.  Patient would benefit from further ACS rule out.  Spoke with JOE Morse, who accepted patient behalf of hospitalist team and Dr. Parsons for admission to hospital.  Results and plan discussed with patient and is agreeable with plan.    Final diagnoses:   Chest pain, unspecified type   Shortness of breath       ED Disposition  ED Disposition     ED Disposition   Decision to Admit    Condition   --    Comment   Level of Care: Telemetry [5]   Admitting Physician: JOSEPH PARSONS [[id number]]   Attending Physician: JOSEPH PARSONS [[id number]]               No follow-up provider specified.       Medication List      No changes were made to your prescriptions during this visit.          Jj Oden PA  01/03/23 0058

## 2023-01-03 NOTE — PLAN OF CARE
Goal Outcome Evaluation: Pt will have a hospitalization free of falls and hospital acquired infections.

## 2023-01-03 NOTE — CASE MANAGEMENT/SOCIAL WORK
Discharge Planning Assessment  [facility]     Patient Name: Sienna Ayala  MRN: [id number]  Today's Date: 1/3/2023    Admit Date: 1/2/2023    Plan: Return Home with spouse   Discharge Needs Assessment     Row Name 01/03/23 1149       Living Environment    People in Home spouse    Name(s) of People in Home Spouse    Current Living Arrangements home    Primary Care Provided by self    Provides Primary Care For no one    Family Caregiver if Needed spouse    Quality of Family Relationships helpful;supportive       Resource/Environmental Concerns    Transportation Concerns none       Transition Planning    Patient/Family Anticipates Transition to home with family    Patient/Family Anticipated Services at Transition none       Discharge Needs Assessment    Readmission Within the Last 30 Days no previous admission in last 30 days    Equipment Currently Used at Home none    Concerns to be Addressed no discharge needs identified               Discharge Plan     Row Name 01/03/23 1151       Plan    Plan Return Home with spouse    Plan Comments Spoke with patient and spouse at bedside, IADL's,Confirmed PCP and Pharmacy,No transportation or prescrption coverage issues.            Demographic Summary     Row Name 01/03/23 1149       General Information    Admission Type observation    Arrived From emergency department    Referral Source emergency department    Reason for Consult discharge planning    Preferred Language English               Functional Status     Row Name 01/03/23 1149       Functional Status, IADL    Medications independent    Meal Preparation independent    Housekeeping independent    Laundry independent    Shopping independent       Mental Status    General Appearance WDL WDL            Met with patient in room wearing PPE: mask, face shield/goggles, gloves, gown.      Maintained distance greater than six feet and spent less than 15 minutes in the room.    ;robby Laws RN

## 2023-01-03 NOTE — H&P
[facility] Medicine Services  History & Physical    Patient Name: Sienna Ayala  : 1985  MRN: [id number]  Primary Care Physician:  Provider, No Known  Date of admission: 2023  Date and Time of Service: 1/3/23     Subjective      Chief Complaint: Chest pain     History of Present Illness: Sienna Ayala is a 37 y.o. female with past medical history of hypertension, diabetes, hyperlipidemia, sleep apnea, GERD, incontinence, anxiety, and neuropathy.  Who presented to [facility] on 2023 complaining of chest pain.    Patient reports father  of an MI at age 54 and family history of coronary artery disease.  She reports was sitting at home when had sudden onset of chest pain located under left breast that radiated to left side of neck and down left arm.  She reports has been having increased shortness of breath over the past couple days and feeling lightheaded.    In the ER CBC and CMP were unremarkable.  Initial troponin was negative.  Serum hCG was negative.  D-dimer negative.  EKG with no acute findings.  Chest x-ray unremarkable for acute findings.  Patient was given full dose aspirin.  Chest pain had subsided throughout the ER stay.  She does have a heart score of 4 and significant risk factors including morbid obesity, diabetes, hypertension, hyperlipidemia as well as family history of heart disease.  She has been admitted for ACS rule out.    Review of Systems   Constitutional: Positive for malaise/fatigue.   HENT: Negative.    Eyes: Negative.    Cardiovascular: Positive for chest pain and dyspnea on exertion.   Respiratory: Positive for shortness of breath.    Skin: Negative.    Musculoskeletal: Negative.    Gastrointestinal: Negative.    Genitourinary: Positive for bladder incontinence.   Neurological:        Positive for peripheral neuropathy   Psychiatric/Behavioral: Positive for depression. The patient is nervous/anxious.         Personal History     Past  Medical History:   Diagnosis Date   • ADHD (attention deficit hyperactivity disorder)    • Anxiety    • Bowel dysfunction     freq diarrhea   • Chronic fatigue    • Depression    • Disorder of sleep-wake cycle     works night shift   • Edema of both lower extremities    • Endometriosis    • Essential hypertension, benign    • Gestational diabetes 2010    with 1st pregnancy, diet tx only   • Insomnia    • Migraine    • Mixed hyperlipidemia    • Morbidly obese (HCC)    • OCD (obsessive compulsive disorder)    • SERENA (obstructive sleep apnea)    • Ovarian cyst    • Reactive airway disease    • Type 2 diabetes mellitus with hyperglycemia (HCC) 2013    with 2nd pregnancy, insulin required   • Uterine fibroid    • Vitamin D deficiency        Past Surgical History:   Procedure Laterality Date   • CHOLECYSTECTOMY     • CYST REMOVAL      from chin area   • FULGURATION ENDOMETRIOSIS     • OVARIAN CYST REMOVAL     • UTERINE FIBROID SURGERY     • WISDOM TOOTH EXTRACTION         Family History: family history is not on file. Otherwise pertinent FHx was reviewed and not pertinent to current issue.    Social History:  reports that she has never smoked. She has never used smokeless tobacco. She reports that she does not currently use alcohol.    Home Medications:  Prior to Admission Medications     Prescriptions Last Dose Informant Patient Reported? Taking?    Bismuth Subsalicylate (Pepto-Bismol) 262 MG tablet Past Month  Yes Yes    buPROPion SR (WELLBUTRIN SR) 150 MG 12 hr tablet 1/2/2023  Yes Yes    Every 12 (Twelve) Hours.    busPIRone (BUSPAR) 10 MG tablet 1/2/2023  Yes Yes    buspirone 10 mg tablet   TAKE 1 TABLET BY MOUTH TWICE A DAY    clonazePAM (KlonoPIN) 0.5 MG tablet 1/2/2023  Yes Yes    clonazepam 0.5 mg tablet   TAKE 1 TABLET BY MOUTH TWICE A DAY    ergocalciferol (ERGOCALCIFEROL) 1.25 MG (43820 UT) capsule Past Week  Yes Yes    ergocalciferol (vitamin D2) 1,250 mcg (50,000 unit) capsule   TAKE 1 CAPSULE EVERY WEEK BY  ORAL ROUTE.    fenofibrate (TRICOR) 145 MG tablet 1/2/2023  Yes Yes    fenofibrate nanocrystallized 145 mg tablet   Take 1 tablet every day by oral route.    gabapentin (NEURONTIN) 300 MG capsule 1/2/2023  Yes Yes    gabapentin 300 mg capsule   TAKE 1 CAPSULE BY MOUTH THREE TIMES A DAY    Glucagon (Baqsimi One Pack) 3 MG/DOSE powder   No Yes    3 mg into the nostril(s) as directed by provider As Needed (For emergency use for severe low glucose: follow directions for use then call 911.).    glucose blood (OneTouch Verio) test strip 1/2/2023  No Yes    Check 5 times a day on insulin tx, poor control, hypoglycemia. Dx: E 11.65    HumaLOG KwikPen 100 UNIT/ML solution pen-injector 1/2/2023  No Yes    Use as directed for meal & correction coverage up to 50 units a day.    HYDROcodone-acetaminophen (NORCO) 7.5-325 MG per tablet Past Month  Yes Yes    Take 1 tablet by mouth Every 6 (Six) Hours.    Insulin Glargine (Lantus SoloStar) 100 UNIT/ML injection pen   No Yes    Inject 15 units once daily in the AM and titrate as directed up to 50 units a day.    Patient taking differently:  30 Units Daily. Inject 15 units once daily in the AM and titrate as directed up to 50 units a day.    Insulin Pen Needle (B-D ULTRAFINE III SHORT PEN) 31G X 8 MM misc 1/2/2023  No Yes    For use with pen device up to 5 times a day. Can substitute based on availability and insurance.    Lancets (OneTouch Delica Plus [id number]) misc 1/2/2023  No Yes    1 each 5 (Five) Times a Day. on insulin tx, poor control, hypoglycemia. Dx: E 11.65    lisdexamfetamine (VYVANSE) 50 MG capsule 1/2/2023  Yes Yes    Vyvanse 50 mg capsule   TAKE 1 CAPSULE BY MOUTH EVERY DAY    losartan (COZAAR) 50 MG tablet 1/2/2023  Yes Yes    losartan 50 mg tablet   Take 1 tablet every day by oral route.    magnesium oxide (MAG-OX) 400 MG tablet   Yes Yes    Take 400 mg by mouth Daily.    metoprolol succinate XL (TOPROL-XL) 100 MG 24 hr tablet 1/2/2023  Yes Yes    metoprolol  succinate  mg tablet,extended release 24 hr   TAKE 1 TABLET BY MOUTH EVERY DAY    modafinil (PROVIGIL) 200 MG tablet 1/2/2023  Yes Yes    modafinil 200 mg tablet   TAKE 1 TABLET BY MOUTH EVERY DAY    omeprazole (prilOSEC) 10 MG capsule 1/2/2023  Yes Yes    ondansetron ODT (ZOFRAN-ODT) 8 MG disintegrating tablet 1/2/2023  Yes Yes    PLACE 1 TABLET TWICE A DAY BY TRANSLINGUAL ROUTE.    oxybutynin XL (DITROPAN XL) 15 MG 24 hr tablet 1/2/2023  Yes Yes    Take 15 mg by mouth Daily.    pioglitazone (Actos) 30 MG tablet 1/2/2023  No Yes    By mouth take one tab daily.    prazosin (MINIPRESS) 2 MG capsule Past Week  Yes Yes    prazosin 2 mg capsule   TAKE 1 CAPSULE EVERY DAY BY ORAL ROUTE AT BEDTIME.    SUMAtriptan (IMITREX) 100 MG tablet 1/2/2023  Yes Yes    TAKE 1 TABLET BY MOUTH AS NEEDED MIGRAINE, MAY REPEAT X 1 AFTER 2 HOURS    albuterol sulfate  (90 Base) MCG/ACT inhaler   Yes No    albuterol sulfate HFA 90 mcg/actuation aerosol inhaler   TAKE 2 PUFFS BY MOUTH EVERY 4 HOURS    Ascorbic Acid (VITAMIN C PO)   Yes No    Cobalamin Combinations (B-12) 100-5000 MCG sublingual tablet   Yes No    Continuous Blood Gluc Sensor (FreeStyle Margie 2 Sensor) misc   No No    1 each Every 14 (Fourteen) Days. Freestyle Margie 14-day sensors 1 for each 14 day period to check 6 times a day. Dx: E 11.65    diphenoxylate-atropine (LOMOTIL) 2.5-0.025 MG per tablet   Yes No    meloxicam (MOBIC) 15 MG tablet   Yes No    meloxicam 15 mg tablet   Take 1 tablet every day by oral route.    Multiple Vitamins-Minerals (ZINC PO)   Yes No    potassium chloride (MICRO-K) 10 MEQ CR capsule   Yes No    Take 10 mEq by mouth 2 (Two) Times a Day.    prochlorperazine (COMPAZINE) 10 MG tablet   Yes No    prochlorperazine maleate 10 mg tablet   Take 1 tablet 3 times a day by oral route.    Rimegepant Sulfate (Nurtec) 75 MG tablet dispersible tablet   Yes No    Nurtec ODT 75 mg disintegrating tablet   TAKE 1 TABLET BY MOUTH EVERY DAY AS NEEDED FOR  MIGRINE DO NOT EXCEED 1 TABLET PER DAY    ubrogepant (UBRELVY) 100 MG tablet   Yes No    Ubrelvy 100 mg tablet   Take 1 tablet PO x 1, may repeat x 1 after 2 hours            Allergies:  Allergies   Allergen Reactions   • Lisinopril Other (See Comments)     Patient states that she experiences heart palpations    • Penicillins Hives     Patient states that she gets hives    • Promethazine Nausea And Vomiting     Patient states that she experiences increased vomiting        Objective      Vitals:   Temp:  [98 °F (36.7 °C)-98.3 °F (36.8 °C)] 98 °F (36.7 °C)  Heart Rate:  [73-97] 76  Resp:  [16-20] 16  BP: ()/(31-87) 109/57    Physical Exam  Constitutional:       Appearance: Normal appearance.      Comments: Obese female   Eyes:      Pupils: Pupils are equal, round, and reactive to light.   Cardiovascular:      Rate and Rhythm: Normal rate and regular rhythm.   Pulmonary:      Effort: Pulmonary effort is normal.      Breath sounds: Normal breath sounds.   Abdominal:      Palpations: Abdomen is soft.   Musculoskeletal:         General: Normal range of motion.   Neurological:      General: No focal deficit present.      Mental Status: She is alert and oriented to person, place, and time.   Psychiatric:         Mood and Affect: Mood normal.         Behavior: Behavior normal.         Thought Content: Thought content normal.         Judgment: Judgment normal.          Result Review    Result Review:  I have personally reviewed the results from the time of this admission to 1/3/2023 06:31 EST and agree with these findings:  [x]  Laboratory  [x]  Microbiology  [x]  Radiology  []  EKG/Telemetry   []  Cardiology/Vascular   []  Pathology  []  Old records  []  Other:  Most notable findings include: See above HPI    Assessment & Plan        Active Hospital Problems:  Active Hospital Problems    Diagnosis    • **Chest pain, unspecified type      Plan:   Chest pain, rule out acute coronary syndrome  -Stress test  -Trend  troponin  -Pain resolved after full dose aspirin in ED  -Monitor vital signs  -EKG no acute abnormality  -Chest x-ray no acute findings  -Consider cardiology consult if needed    Diabetes  -We will give sliding scale insulin as patient is n.p.o. for exam  -Resume home medications once able to take p.o.    Hypertension  -Resume home medications once able to take p.o.    Hyperlipidemia  -Continue fenofibrate once able to take p.o.    History of sleep apnea with home CPAP  -Resume at discharge    History of anxiety and depression  -May continue home BuSpar, Wellbutrin and clonazepam once able to take p.o.    Incontinence  -May resume oxybutynin once able to take p.o.    Analgesics and emetics as needed  DVT prophylaxis:  Mechanical DVT prophylaxis orders are present.    CODE STATUS:    Code Status (Patient has no pulse and is not breathing): CPR (Attempt to Resuscitate)  Medical Interventions (Patient has pulse or is breathing): Full Support    Admission Status:  I believe this patient meets observation status.    I discussed the patient's findings and my recommendations with patient and family.    This patient has been examined wearing appropriate Personal Protective Equipment    Signature: Electronically signed by JOE Mendoza, 01/03/23, 06:31 EST.  [facility]

## 2023-01-05 LAB — QT INTERVAL: 392 MS

## 2023-01-09 ENCOUNTER — TELEPHONE (OUTPATIENT)
Dept: ENDOCRINOLOGY | Age: 38
End: 2023-01-09

## 2023-05-24 ENCOUNTER — TELEPHONE (OUTPATIENT)
Dept: ENDOCRINOLOGY | Facility: CLINIC | Age: 38
End: 2023-05-24
Payer: COMMERCIAL

## 2023-06-09 ENCOUNTER — TELEPHONE (OUTPATIENT)
Dept: ORTHOPEDIC SURGERY | Facility: CLINIC | Age: 38
End: 2023-06-09
Payer: COMMERCIAL

## 2023-06-09 ENCOUNTER — OFFICE VISIT (OUTPATIENT)
Dept: PODIATRY | Facility: CLINIC | Age: 38
End: 2023-06-09
Payer: COMMERCIAL

## 2023-06-09 VITALS — WEIGHT: 293 LBS | BODY MASS INDEX: 50.02 KG/M2 | HEIGHT: 64 IN | HEART RATE: 97 BPM | OXYGEN SATURATION: 99 %

## 2023-06-09 DIAGNOSIS — M24.573 EQUINUS CONTRACTURE OF ANKLE: ICD-10-CM

## 2023-06-09 DIAGNOSIS — L60.8 PINCER NAIL DEFORMITY: ICD-10-CM

## 2023-06-09 DIAGNOSIS — M79.671 BILATERAL FOOT PAIN: Primary | ICD-10-CM

## 2023-06-09 DIAGNOSIS — M72.2 PLANTAR FASCIITIS: ICD-10-CM

## 2023-06-09 DIAGNOSIS — L60.0 INGROWN TOENAIL: ICD-10-CM

## 2023-06-09 DIAGNOSIS — M79.672 BILATERAL FOOT PAIN: Primary | ICD-10-CM

## 2023-06-09 DIAGNOSIS — M77.30 HEEL SPUR, UNSPECIFIED LATERALITY: ICD-10-CM

## 2023-06-09 RX ORDER — TIRZEPATIDE 5 MG/.5ML
INJECTION, SOLUTION SUBCUTANEOUS
COMMUNITY
Start: 2023-05-24

## 2023-06-09 RX ORDER — PROCHLORPERAZINE 25 MG/1
SUPPOSITORY RECTAL SEE ADMIN INSTRUCTIONS
COMMUNITY
Start: 2023-04-24

## 2023-06-09 RX ORDER — ALBUTEROL SULFATE 90 UG/1
AEROSOL, METERED RESPIRATORY (INHALATION)
COMMUNITY
Start: 2023-05-24

## 2023-06-09 RX ORDER — PROCHLORPERAZINE 25 MG/1
SUPPOSITORY RECTAL SEE ADMIN INSTRUCTIONS
COMMUNITY
Start: 2023-05-24

## 2023-06-09 RX ADMIN — TRIAMCINOLONE ACETONIDE 20 MG: 40 INJECTION, SUSPENSION INTRA-ARTICULAR; INTRAMUSCULAR at 08:07

## 2023-06-09 NOTE — TELEPHONE ENCOUNTER
PLEASE ADVISE ON IF PATIENT CAN WAIT AN EXTRA WEEK DUE TO ELIOT NOT HAVING ANYTHING IN TWO WEEKS.

## 2023-06-09 NOTE — PROGRESS NOTES
"06/09/2023  Foot and Ankle Surgery - New Patient   Provider: JOE Walls   Location: [facility]    Subjective:  Sienna Ayala is a 37 y.o. female.     Chief Complaint   Patient presents with   • Right Foot - Pain, Ingrown Toenail   • Left Foot - Pain   • Follow-up     MD Meliton May 2023     Patient presents today, new to service with concerns regarding chronic ingrown toenail to the R great toe and chronic bilateral heel pain.     The patient was previously seen by . She reports she was unhappy with her care there and she was not informed of some diagnoses that were in her chart. She mentions she was unaware she had bunions and tendinitis. She presents with her mother.     The adult female reports Dr. Coelho sent her a referral to a specialist regarding her ingrown toenail and reinfection. She states she does not recall having her right great toenail removed, but does remember a partial nail removal. She reports the nail was thicker previously and she cut the top to keep it from \"going down\" in her skin and reinfecting her toe.    She reports she has been experiencing bilateral plantar fasciitis. She affirms her heels hurt her after she has been on them for awhile. She affirms she had an x-ray completed today. The patient adds Hoka shoes stopped working for her and her current shoes are more stable for her.    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 She states she comes home from work some days and it is worse due to the shoe cushion. Her mother adds the patient comes home with bloody socks related to ingrown toenail.    She reports her legs from the knee down is her problem and she cannot function. Her mother mentions the patient has abnormally high arches and she has used braces in the past. Her mother states the patient has been fighting this for 5-6 years. She reports she was seen by Dr. Vo in the past. She adds she lost weight and her symptoms improved. The " patient mentions she has had steroid injections in the past for her pain and she is interested in obtaining one today.    The patient reports she fell down in 01/2023 and was told her knee was fine but no one wanted to look at her ankle or leg due to a bump. She states she now has a bruise that is lined up with her bruise. She adds she has discomfort on the back of her calf as well that has been present since 01/2023.     She reports she is a diabetic and her last hemoglobin A1c was 10.8 percent, but blood glucose levels are improving since new treatment regimens. She reports her blood glucose now is usually around 130.       Allergies   Allergen Reactions   • Lisinopril Other (See Comments)     Patient states that she experiences heart palpations    • Penicillins Hives     Patient states that she gets hives    • Promethazine Nausea And Vomiting     Patient states that she experiences increased vomiting        Past Medical History:   Diagnosis Date   • ADHD (attention deficit hyperactivity disorder)    • Anxiety    • Asthma     Sleep apnea   • Bowel dysfunction     freq diarrhea   • Chronic fatigue    • Depression    • Difficulty walking 2020    Keeps getting worse   • Disorder of sleep-wake cycle     works night shift   • Edema of both lower extremities    • Endometriosis    • Essential hypertension, benign    • Gestational diabetes 2010    with 1st pregnancy, diet tx only   • High arches    • Ingrown toenail 2021    Keeps getting infected have been on antibiotic several times for this.   • Insomnia    • Migraine    • Mixed hyperlipidemia    • Morbidly obese    • OCD (obsessive compulsive disorder)    • SERENA (obstructive sleep apnea)    • Ovarian cyst    • Plantar fasciitis     Ongoing problem for years. Got getter when weight lost around 2011 but  2018 or so gained weight+ job = pain severe now.   • Reactive airway disease    • Type 2 diabetes mellitus with hyperglycemia 2013    with 2nd pregnancy, insulin  required   • Uterine fibroid    • Vitamin D deficiency        Past Surgical History:   Procedure Laterality Date   • CHOLECYSTECTOMY     • CYST REMOVAL      from chin area   • FULGURATION ENDOMETRIOSIS     • OVARIAN CYST REMOVAL     • UTERINE FIBROID SURGERY     • WISDOM TOOTH EXTRACTION         Family History   Problem Relation Age of Onset   • Diabetes Mother         Type II   • Hypertension Mother    • Thyroid disease Mother    • Heart disease Father          54 yoa. Heart attack, atherosclerosis   • Heart disease Maternal Grandmother         CHF, mitral valve prolapse/regurgitation   • Cancer Paternal Grandfather         Lung       Social History     Socioeconomic History   • Marital status:    Tobacco Use   • Smoking status: Never     Passive exposure: Never   • Smokeless tobacco: Never   Vaping Use   • Vaping Use: Never used   Substance and Sexual Activity   • Alcohol use: Never   • Drug use: Never   • Sexual activity: Yes     Partners: Male     Birth control/protection: Surgical, Inserts, Essure     Comment: Essure        Current Outpatient Medications on File Prior to Visit   Medication Sig Dispense Refill   • albuterol sulfate  (90 Base) MCG/ACT inhaler      • Bismuth Subsalicylate (Pepto-Bismol) 262 MG tablet      • buPROPion SR (WELLBUTRIN SR) 150 MG 12 hr tablet Every 12 (Twelve) Hours.     • busPIRone (BUSPAR) 10 MG tablet Take 1 tablet by mouth 2 (Two) Times a Day.     • clonazePAM (KlonoPIN) 0.5 MG tablet Take 1 tablet by mouth 2 (Two) Times a Day As Needed.     • Continuous Blood Gluc  (Dexcom G6 ) device See Admin Instructions.     • Continuous Blood Gluc Sensor (Dexcom G6 Sensor) See Admin Instructions.     • ergocalciferol (ERGOCALCIFEROL) 1.25 MG (75172 UT) capsule Take 1 capsule by mouth 1 (One) Time Per Week.     • fenofibrate (TRICOR) 145 MG tablet Take 1 tablet by mouth Daily.     • gabapentin (NEURONTIN) 300 MG capsule Take 1 capsule by mouth 3 (Three)  Times a Day.     • HumaLOG KwikPen 100 UNIT/ML solution pen-injector Use as directed for meal & correction coverage up to 50 units a day. 45 mL 2   • insulin glargine (LANTUS, SEMGLEE) 100 UNIT/ML injection Inject 30 Units under the skin into the appropriate area as directed Every Morning.     • Lancets (OneTouch Delica Plus [id number]) misc 1 each 5 (Five) Times a Day. on insulin tx, poor control, hypoglycemia. Dx: E 11.65 500 each 4   • lisdexamfetamine (VYVANSE) 50 MG capsule Take 1 capsule by mouth Every Morning     • losartan (COZAAR) 50 MG tablet Take 1 tablet by mouth Daily.     • magnesium oxide (MAG-OX) 400 MG tablet Take 1 tablet by mouth Daily.     • metoprolol succinate XL (TOPROL-XL) 100 MG 24 hr tablet metoprolol succinate  mg tablet,extended release 24 hr   TAKE 1 TABLET BY MOUTH EVERY DAY     • modafinil (PROVIGIL) 200 MG tablet Take 1 tablet by mouth Daily.     • Mounjaro 5 MG/0.5ML solution pen-injector INJECT 5 MG SUBCUTANEOUSLY WEEKLY     • omeprazole (prilOSEC) 10 MG capsule      • ondansetron ODT (ZOFRAN-ODT) 8 MG disintegrating tablet Place 1 tablet on the tongue Every 8 (Eight) Hours As Needed.     • oxybutynin XL (DITROPAN XL) 15 MG 24 hr tablet Take 1 tablet by mouth Daily.     • pioglitazone (Actos) 30 MG tablet By mouth take one tab daily. (Patient taking differently: 1.5 tablets. By mouth take one tab daily.) 90 tablet 2   • SUMAtriptan (IMITREX) 100 MG tablet Take 1 tablet by mouth As Needed.     • Glucagon (Baqsimi One Pack) 3 MG/DOSE powder 3 mg into the nostril(s) as directed by provider As Needed (For emergency use for severe low glucose: follow directions for use then call 911.). (Patient not taking: Reported on 6/9/2023) 1 each 6   • HYDROcodone-acetaminophen (NORCO) 7.5-325 MG per tablet Take 1 tablet by mouth Every 6 (Six) Hours.     • prazosin (MINIPRESS) 2 MG capsule Take 2 mg by mouth Every Night.       No current facility-administered medications on file prior to visit.  "        Objective   Pulse 97   Ht 162.6 cm (64\")   Wt (!) 155 kg (342 lb)   SpO2 99%   BMI 58.70 kg/m²     Foot/Ankle Exam    GENERAL  Appearance:  appears stated age and obese  Orientation:  AAOx3  Affect:  appropriate  Gait:  unimpaired  Assistance:  independent  Right shoe gear: sock and casual shoe  Left shoe gear: sock and casual shoe    VASCULAR     Right Foot Vascularity   Dorsalis pedis:  2+  Posterior tibial:  2+  Skin temperature:  warm  Edema gradin+  CFT:  < 3 seconds  Pedal hair growth:  Present  Varicosities:  none     Left Foot Vascularity   Dorsalis pedis:  2+  Posterior tibial:  2+  Skin temperature:  warm  Edema gradin+  CFT:  < 3 seconds  Pedal hair growth:  Present  Varicosities:  none     NEUROLOGIC     Right Foot Neurologic   Light touch sensation: normal     Left Foot Neurologic   Light touch sensation: normal    MUSCULOSKELETAL     Right Foot Musculoskeletal   Ecchymosis:  none  Tenderness:  plantar fascia tenderness and toe 1 tenderness       Left Foot Musculoskeletal   Ecchymosis:  none  Tenderness:  plantar fascia tenderness    DERMATOLOGIC      Right Foot Dermatologic   Skin  Right foot skin is intact.   Nails  1.  Positive for onychomycosis, abnormal thickness, dystrophic nail, ingrown toenail and pincer.     Left Foot Dermatologic   Skin  Left foot skin is intact.      Right foot additional comments: 2023: Right big toe - pincer toenail        Assessment & Plan   Diagnoses and all orders for this visit:    1. Bilateral foot pain (Primary)  -     XR Foot 3+ View Bilateral  -     triamcinolone acetonide (KENALOG-40) injection 20 mg  -     Injection Tendon or Ligament    2. Ingrown toenail  -     Nail Removal      I advised the patient to try Epsom salt soaks and keep her nails trimmed.discussed ingrown nails and pincer nail deformity. Did discuss treatment options that since nail is severely deformed with onychomycosis I do not think conservative treatment will work. " Did review previous notes from podiatry which show pt had partial nail removal in 2021. Given that symptoms have return we did discuss total nail removal with and without matrixtomy.  Patient would like to proceed with total nail removal with matrixtomy of the right great toenail. She was informed of risk of difficulties healing due to elevated blood glucose but is adament to have nail removed permanently.       Did review xray of bilat feet with patient. Do feel patient has s/s of plantar fascitis. I discussed with her that walking abnormally due to her toe, can make her plantar fasciitis flare up. I supplied the patient calf stretching exercises. I informed her plantar fasciitis can be chronic and can return if it is not well maintaned. I advised her increased activity or weight gain can cause it to get worse. I recommended insoles as well as physical therapy. I advised her to avoid ambulating barefoot and wear supportive shoes. I informed her she can speak to Dr. Gustafson if she is interested in surgery. The patient will obtain a steroid injection in her right foot. I recommended she wear a Cam walking boot and have light duty at work. Discussed with patient that pain can increase after injection for a few days. Patient is adamant that she needs steroid injection today in the right foot. She will obtain insoles for shoes but reports she has tried this and calf stretching previously. She would like to discuss further treatment options with Dr. Gustafson as soon as possible.     Will have patient follow up with Dr. Gustafson in 2 weeks for reevaluation.      Plantar Fascia Steroid Injection: Right foot    Consent and time out was performed before proceeding with the procedure.  The area of maximal tenderness was palpated near the plantar medial calcaneal tuberosity of right foot. The area was cleansed with alcohol.  Using anatomical landmarks, the plantar fascia was located and a solution totaling 1.5 mL was injected about  the origin of the plantar fascia.  The solution contained 0.5 mL of 1% lidocaine plain, 0.5 mL of 0.5% Marcaine plain, and 0.5 mL of Kenalog.  After the injection, the patient noted immediate pain relief.  Mild compression was placed at the injection site followed by a sterile bandage.  The patient tolerated the injection well without complication.    Total Nail Avulsion with Chemical Matrixectomy: Right hallux    Consent and time out was performed before proceeding with the procedure.  Right hallux was cleansed with alcohol and the digit was blocked in a ring block fashion utilizing 5 mL of 1% lidocaine plain.  A digital tourniquet was applied to the digit.  The nail was lifted from the nail bed and nail margin with a Bassfield.  The offending nail margins were grasped with a hemostat and removed. The nail borders were explored with a curette to ensure adequate removal.  Matrixectomy was performed utilizing three 30 second applications of 89% phenol on a micro-tip applicator.  The area was then rinsed with alcohol to dilute the phenol. The nail fold and exposed nail bed were flushed with normal saline.  Antibiotic ointment followed by sterile compressive dressings were then applied.  The digital tourniquet was removed.  No excessive bleeding or complications were evident.  The patient tolerated procedure and local anesthetic well.    Orders Placed This Encounter   Procedures   • Nail Removal     Order Specific Question:   Release to patient     Answer:   Routine Release   • Injection Tendon or Ligament     Order Specific Question:   Release to patient     Answer:   Routine Release   • XR Foot 3+ View Bilateral     Order Specific Question:   Reason for Exam:     Answer:   plantar pain, room 10 wb     Order Specific Question:   Does this patient have a diabetic monitoring/medication delivering device on?     Answer:   Yes     Comments:   dexacon     Order Specific Question:   Release to patient     Answer:   Routine  Release        Transcribed from ambient dictation for JOE Orellana by Lisa Yu.  06/09/23   14:35 EDT    Patient or patient representative verbalized consent to the visit recording.  I have personally performed the services described in this document as transcribed by the above individual, and it is both accurate and complete.  JOE Orellana  6/10/2023  08:57 EDT

## 2023-06-10 RX ORDER — TRIAMCINOLONE ACETONIDE 40 MG/ML
20 INJECTION, SUSPENSION INTRA-ARTICULAR; INTRAMUSCULAR ONCE
Status: COMPLETED | OUTPATIENT
Start: 2023-06-10 | End: 2023-06-09

## 2023-06-13 ENCOUNTER — TELEPHONE (OUTPATIENT)
Dept: PODIATRY | Facility: CLINIC | Age: 38
End: 2023-06-13

## 2023-06-13 NOTE — TELEPHONE ENCOUNTER
Caller: Sienna Ayala  PATIENT REQUEST TO CALL HER  DAVID [phone]  Relationship: Self    Best call back number: [phone]    What is the best time to reach you: ANY    Who are you requesting to speak with (clinical staff, provider,  specific staff member): RUBY     Do you know the name of the person who called: RUBY     What was the call regarding: SCHEDULING WITH DR. MCCABE

## 2023-08-14 ENCOUNTER — OFFICE VISIT (OUTPATIENT)
Dept: PODIATRY | Facility: CLINIC | Age: 38
End: 2023-08-14
Payer: COMMERCIAL

## 2023-08-14 VITALS — RESPIRATION RATE: 20 BRPM | WEIGHT: 293 LBS | BODY MASS INDEX: 50.02 KG/M2 | HEIGHT: 64 IN

## 2023-08-14 DIAGNOSIS — E11.65 TYPE 2 DIABETES MELLITUS WITH HYPERGLYCEMIA, WITHOUT LONG-TERM CURRENT USE OF INSULIN: ICD-10-CM

## 2023-08-14 DIAGNOSIS — M79.672 BILATERAL FOOT PAIN: Primary | ICD-10-CM

## 2023-08-14 DIAGNOSIS — M21.6X1 ACQUIRED EQUINUS DEFORMITY OF BOTH FEET: ICD-10-CM

## 2023-08-14 DIAGNOSIS — M72.2 PLANTAR FASCIITIS, BILATERAL: ICD-10-CM

## 2023-08-14 DIAGNOSIS — M79.671 BILATERAL FOOT PAIN: Primary | ICD-10-CM

## 2023-08-14 DIAGNOSIS — E66.01 MORBID OBESITY WITH BMI OF 50.0-59.9, ADULT: ICD-10-CM

## 2023-08-14 DIAGNOSIS — M21.6X2 ACQUIRED EQUINUS DEFORMITY OF BOTH FEET: ICD-10-CM

## 2023-08-14 NOTE — PROGRESS NOTES
08/14/2023  Foot and Ankle Surgery - Established Patient/Follow-up  Provider: Dr. Chapo Gustafson DPM  Location: [facility]    Subjective:  Sienna Ayala is a 37 y.o. female.     Chief Complaint   Patient presents with    Right Foot - Follow-up    Follow-up     ESTEBAN Palominoz 11/3/22       HPI: Sienna Ayala is a 37-year-old female who presents to the office today for a follow-up for issues involving her bilateral feet. She is accompanied by an adult female today.    The patient was last seen approximately 2 months ago for plantar fasciitis. She states that she did not attend physical therapy due to a scheduling issue. She notes that she injured herself when she stepped down off of her cousin's porch. The patient denies rolling her ankle. She reports pain anytime she is off level. She states that her blood glucose has been in the 130 mg/dL range. She denies that her A1c has been repeated since her last appointment. 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 She was able to get to the [facility] until her toe healed. The patient believes that her toe has healed; however, she is unsure what it is. The female adult states that JOE Burkett applied acid to her toe. The patient has lost 15 pounds. She limps while ambulating on level ground. She has bone-on-bone issues. She has difficulty taking Motrin. The patient received a steroid injection in her right foot on 06/09/2023 by JOE Burkett. She does not recall how many injections she received. She has received steroid injections in the past.    Allergies   Allergen Reactions    Lisinopril Other (See Comments)     Patient states that she experiences heart palpations     Penicillins Hives     Patient states that she gets hives     Promethazine Nausea And Vomiting     Patient states that she experiences increased vomiting        Current Outpatient Medications on File Prior to Visit   Medication Sig Dispense Refill    albuterol  sulfate  (90 Base) MCG/ACT inhaler       Bismuth Subsalicylate (Pepto-Bismol) 262 MG tablet       buPROPion SR (WELLBUTRIN SR) 150 MG 12 hr tablet Every 12 (Twelve) Hours.      busPIRone (BUSPAR) 10 MG tablet Take 1 tablet by mouth 2 (Two) Times a Day.      clonazePAM (KlonoPIN) 0.5 MG tablet Take 1 tablet by mouth 2 (Two) Times a Day As Needed.      Continuous Blood Gluc  (Dexcom G6 ) device See Admin Instructions.      Continuous Blood Gluc Sensor (Dexcom G6 Sensor) See Admin Instructions.      ergocalciferol (ERGOCALCIFEROL) 1.25 MG (86275 UT) capsule Take 1 capsule by mouth 1 (One) Time Per Week.      fenofibrate (TRICOR) 145 MG tablet Take 1 tablet by mouth Daily.      gabapentin (NEURONTIN) 300 MG capsule Take 1 capsule by mouth 3 (Three) Times a Day.      Glucagon (Baqsimi One Pack) 3 MG/DOSE powder 3 mg into the nostril(s) as directed by provider As Needed (For emergency use for severe low glucose: follow directions for use then call 911.). 1 each 6    HumaLOG KwikPen 100 UNIT/ML solution pen-injector Use as directed for meal & correction coverage up to 50 units a day. 45 mL 2    insulin glargine (LANTUS, SEMGLEE) 100 UNIT/ML injection Inject 30 Units under the skin into the appropriate area as directed Every Morning.      Lancets (OneTouch Delica Plus [id number]) misc 1 each 5 (Five) Times a Day. on insulin tx, poor control, hypoglycemia. Dx: E 11.65 500 each 4    lisdexamfetamine (VYVANSE) 50 MG capsule Take 1 capsule by mouth Every Morning      losartan (COZAAR) 50 MG tablet Take 1 tablet by mouth Daily.      magnesium oxide (MAG-OX) 400 MG tablet Take 1 tablet by mouth Daily.      metoprolol succinate XL (TOPROL-XL) 100 MG 24 hr tablet metoprolol succinate  mg tablet,extended release 24 hr   TAKE 1 TABLET BY MOUTH EVERY DAY      modafinil (PROVIGIL) 200 MG tablet Take 1 tablet by mouth Daily.      Mounjaro 5 MG/0.5ML solution pen-injector INJECT 5 MG SUBCUTANEOUSLY WEEKLY       "omeprazole (prilOSEC) 10 MG capsule       ondansetron ODT (ZOFRAN-ODT) 8 MG disintegrating tablet Place 1 tablet on the tongue Every 8 (Eight) Hours As Needed.      oxybutynin XL (DITROPAN XL) 15 MG 24 hr tablet Take 1 tablet by mouth Daily.      pioglitazone (Actos) 30 MG tablet By mouth take one tab daily. (Patient taking differently: 1.5 tablets. By mouth take one tab daily.) 90 tablet 2    SUMAtriptan (IMITREX) 100 MG tablet Take 1 tablet by mouth As Needed.      prazosin (MINIPRESS) 2 MG capsule Take 2 mg by mouth Every Night.       No current facility-administered medications on file prior to visit.       Objective   Resp 20   Ht 162.6 cm (64\")   Wt (!) 155 kg (342 lb)   BMI 58.70 kg/mý     Foot/Ankle Exam  GENERAL  Appearance:  obese  Orientation:  AAOx3  Affect:  appropriate    VASCULAR     Right Foot Vascularity   Normal vascular exam    Dorsalis pedis:  2+  Posterior tibial:  2+  Skin temperature:  warm  Edema grading:  None  CFT:  < 3 seconds  Pedal hair growth:  Present  Varicosities:  none     Left Foot Vascularity   Normal vascular exam    Dorsalis pedis:  2+  Posterior tibial:  2+  Skin temperature:  warm  Edema grading:  None  CFT:  < 3 seconds  Pedal hair growth:  Present  Varicosities:  none     NEUROLOGIC     Right Foot Neurologic   Light touch sensation: normal  Hot/Cold sensation: normal  Achilles reflex:  2+     Left Foot Neurologic   Light touch sensation: normal  Hot/Cold sensation:  normal  Achilles reflex:  2+    MUSCULOSKELETAL     Right Foot Musculoskeletal   Arch:  Normal     Left Foot Musculoskeletal   Arch:  Normal    MUSCLE STRENGTH     Right Foot Muscle Strength   Normal strength    Foot dorsiflexion:  5  Foot plantar flexion:  5  Foot inversion:  5  Foot eversion:  5     Left Foot Muscle Strength   Normal strength    Foot dorsiflexion:  5  Foot plantar flexion:  5  Foot inversion:  5  Foot eversion:  5    DERMATOLOGIC      Right Foot Dermatologic   Skin  Right foot skin is " intact.      Left Foot Dermatologic   Skin  Left foot skin is intact.     TESTS     Right Foot Tests   Anterior drawer: negative  Varus tilt: negative     Left Foot Tests   Anterior drawer: negative  Varus tilt: negative     Right foot additional comments:  Moderate equinus contracture with knee extended and flexed. Discomfort with palpation involving the plantar medial calcaneal tuberosity. No obvious deformity or instability.   Mild erythema and maceration involving the right hallux nail bed consistent with previous total nail avulsion with chemical matrixectomy.     Left foot additional comments:  Moderate equinus contracture with knee extended and flexed. Discomfort with palpation involving the plantar medial calcaneal tuberosity. No obvious deformity or instability.     08/14/2023: Continued pain with palpation involving the plantar aspect of the right heel. Discomfort with palpation involving the ankle and perimalleolar region.      Assessment & Plan   Diagnoses and all orders for this visit:    1. Bilateral foot pain (Primary)    2. Plantar fasciitis, bilateral    3. Acquired equinus deformity of both feet    4. Type 2 diabetes mellitus with hyperglycemia, without long-term current use of insulin  -     Hemoglobin A1c; Future    5. Morbid obesity with BMI of 50.0-59.9, adult      Patient returns with significant pain involving both feet.  She has not noticed any significant improvement since last exam.  She did not get a chance to participate with physical therapy.  She is concerned that symptoms will continue to progress causing further limitation.  She is at her wits end.  I did explain that we should understand what her most recent A1c is.  She feels that her glycemic control has improved since last exam.  She has also lost 15 pounds.  Patient states that she is unable to increase her activity secondary to her foot pain.  I did explain that continued conservative care would include physical therapy and  possibly steroid injections if her blood sugars are appropriate.  Otherwise, the only option would be consideration for surgical management which would include gastrocnemius recession as well as endoscopic plantar fasciotomy.  We did briefly discuss the option.  Patient would like to obtain her hemoglobin A1c and consider the options.  I have asked that she continue wearing the arch supports and appropriate shoes along with daily stretching manual therapy.  Patient is to return in 2 to 3 weeks for reevaluation and further discussion.  Greater than 20 minutes spent before, during, and after evaluation for patient care.    Orders Placed This Encounter   Procedures    Hemoglobin A1c     Standing Status:   Future     Standing Expiration Date:   8/14/2024     Order Specific Question:   Release to patient     Answer:   Routine Release [[id number]]          Note is dictated utilizing voice recognition software. Unfortunately this leads to occasional typographical errors. I apologize in advance if the situation occurs. If questions occur please do not hesitate to call our office.    Transcribed from ambient dictation for GERARD Gustafson DPM by Radha Pitt.  08/14/23   12:18 EDT    Patient or patient representative verbalized consent to the visit recording.  I have personally performed the services described in this document as transcribed by the above individual, and it is both accurate and complete.

## 2023-08-21 ENCOUNTER — TREATMENT (OUTPATIENT)
Dept: PHYSICAL THERAPY | Facility: CLINIC | Age: 38
End: 2023-08-21
Payer: COMMERCIAL

## 2023-08-21 DIAGNOSIS — M21.6X2 ACQUIRED EQUINUS DEFORMITY OF BOTH FEET: ICD-10-CM

## 2023-08-21 DIAGNOSIS — M79.671 BILATERAL FOOT PAIN: Primary | ICD-10-CM

## 2023-08-21 DIAGNOSIS — M21.6X1 ACQUIRED EQUINUS DEFORMITY OF BOTH FEET: ICD-10-CM

## 2023-08-21 DIAGNOSIS — M72.2 PLANTAR FASCIITIS, BILATERAL: ICD-10-CM

## 2023-08-21 DIAGNOSIS — M79.672 BILATERAL FOOT PAIN: Primary | ICD-10-CM

## 2023-08-21 DIAGNOSIS — R26.2 DIFFICULTY WALKING: ICD-10-CM

## 2023-08-21 PROCEDURE — 97110 THERAPEUTIC EXERCISES: CPT | Performed by: PHYSICAL THERAPIST

## 2023-08-21 PROCEDURE — 97162 PT EVAL MOD COMPLEX 30 MIN: CPT | Performed by: PHYSICAL THERAPIST

## 2023-08-21 PROCEDURE — 97140 MANUAL THERAPY 1/> REGIONS: CPT | Performed by: PHYSICAL THERAPIST

## 2023-08-21 NOTE — PROGRESS NOTES
Physical Therapy Initial Evaluation and Plan of Care  [address] [address]    Patient: Sienna Ayala   : 1985  Diagnosis/ICD-10 Code:  Bilateral foot pain [M79.671, M79.672]  Referring practitioner: GERARD Gustafson DPM  Date of Initial Visit: 2023  Today's Date: 2023  Patient seen for 1 session         Visit Diagnoses:     ICD-10-CM ICD-9-CM   1. Bilateral foot pain  M79.671 729.5    M79.672    2. Plantar fasciitis, bilateral  M72.2 728.71   3. Acquired equinus deformity of both feet  M21.6X1 736.72    M21.6X2    4. Difficulty walking  R26.2 719.7       Subjective Questionnaire: LEFS: 48/80 points    Subjective Evaluation    History of Present Illness  Mechanism of injury: Patient presents to physical therapy with orders to address bilateral foot pain and plantar fasciitis.  She states that this has been a problem for years but has been getting worse.  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       She states that her right foot bothers her more than the left.  She reports that she had Achilles issues as a child and even wore braces to bed.  She states that she has had treatment on and off over the years.  She states that she has lost 14 pounds but still has weight that she needs to work on.  She states that Dr. Gustafson talked to her about gastroc recession and endoscopic plantar fasciotomy but she would need to lose weight and work on lowering her A1C.  She states that she was a toe walker until the age of 11 so this has been a long standing problem.     She recently stepped wrong off of a porch (3 weeks ago) and twisted her R ankle.  She states that she has had increased pain in her Achilles and lateral foot.  She states that there are days where she doesn't want to get out of bed because her feet hurt so bad.  It takes about 2 days to recuperate after working because of increased foot pain.    She states that she has  1 step entry to her home but she frequently visits her mother and she lives in a bi-level so she needs to manage stairs frequently.      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 Gonzalez and Dr. Gustafson have given injections in the past)  Current treatment: physical therapy  Patient Goals  Patient goals for therapy: decreased pain, improved balance, increased motion and increased strength         Objective          Static Posture     Ankle/Foot   Ankle/Foot (Left): Equinus and pes planus.   Ankle/Foot (Right): Equinus and pes planus.     Tenderness   Left Ankle/Foot   Tenderness in the medial calcaneus and plantar fascia.     Right Ankle/Foot   Tenderness in the medial calcaneus and plantar fascia.     Neurological Testing     Sensation     Ankle/Foot   Left Ankle/Foot   Intact: light touch    Right Ankle/Foot   Intact: light touch     Active Range of Motion   Left Ankle/Foot   Dorsiflexion (ke): 10 degrees   Plantar flexion: 60 degrees   Inversion: 27 degrees   Eversion: 20 degrees     Right Ankle/Foot   Dorsiflexion (ke): 10 degrees   Plantar flexion: 60 degrees   Inversion: 25 degrees   Eversion: 18 degrees     Strength/Myotome Testing     Left Ankle/Foot   Dorsiflexion: 4  Plantar flexion: 3  Inversion: 4  Eversion: 4    Right Ankle/Foot   Dorsiflexion: 4  Plantar flexion: 3  Inversion: 4  Eversion: 4    Ambulation   Weight-Bearing Status   Assistive device used: none    Observational Gait   Gait: antalgic     Functional Assessment     Single Leg Stance   Left: 0 seconds  Right: 0 seconds      Patient  Education: Discussed treatment plan and initiated HEP with handout and Medbridge access code: [email or web address]    Assessment & Plan       Assessment  Impairments: abnormal gait, abnormal muscle firing, abnormal or restricted ROM, activity intolerance, impaired balance, impaired physical strength, lacks appropriate home exercise program, pain with function and weight-bearing intolerance   Functional limitations: sleeping, walking, uncomfortable because of pain, sitting, standing, stooping and unable to perform repetitive tasks   Assessment details: The patient is a 37 y.o. female who presents to physical therapy today for bilateral foot pain and plantar fasciitis. Upon initial evaluation, the patient demonstrates the following impairments: moderate daily pain, loss of ROM, LE weakness, decreased balance, fascial restrictions, and decreased flexibility. Due to these impairments, the patient is unable to tolerate prolonged standing or walking without increased pain. The patient would benefit from skilled PT services to address functional limitations and impairments and to improve patient quality of life.    Barriers to therapy: Diabetes, weight, finances, work schedule  Prognosis: good    Goals  Plan Goals: STG's: 3 weeks   Patient will report pain level at worse </= 7/10 for improved tolerance to ADLs and functional mobility  Patient will require <3 tactile or verbal cues for proper mechanics during completion of her HEP      LTG's: By Discharge  Patient will report pain levels at worst </= 5/10 for improved tolerance to ADLs and functional mobility  Patient will be able to ambulate unlimited distances without an assistive device and no increased pain  Patient will be able to complete single leg stance on stable surface with no UE support, with supervision for durations > 15 seconds on bilat LE to decrease risk of falls  Patient will report >75% improvement in her ability to tolerate standing for durations > 20 minutes per  reduction in her symptoms   Patient will report improved levels of overall function as demonstrated by an increase in her reported level of function score on the LEFS to >/= 55/80    Patient will report improvement in their tolerance to sleeping and report waking up </= 1x/night per positional discomfort  Patient will require no tactile or verbal cues for proper mechaics during completion of her HEP for final independence     Plan  Therapy options: will be seen for skilled therapy services  Planned modality interventions: cryotherapy, electrical stimulation/Russian stimulation, TENS, thermotherapy (hydrocollator packs) and ultrasound  Planned therapy interventions: balance/weight-bearing training, flexibility, functional ROM exercises, gait training, home exercise program, joint mobilization, manual therapy, neuromuscular re-education, soft tissue mobilization, strengthening, stretching and therapeutic activities  Frequency: 2x week  Duration in weeks: 12  Treatment plan discussed with: patient      History # of Personal Factors and/or Comorbidities: MODERATE (1-2)  Examination of Body System(s): # of elements: MODERATE (3)  Clinical Presentation: EVOLVING  Clinical Decision Making: MODERATE      Timed:         Manual Therapy:    15     mins  90234;     Therapeutic Exercise:    15     mins  17835;     Neuromuscular Tony:        mins  10166;    Therapeutic Activity:          mins  09150;     Gait Training:           mins  86468;     Ultrasound:          mins  52923;    Ionto                                   mins   36028  Self Care                            mins   88349    Un-Timed:  Electrical Stimulation:         mins  27837 ( );  Canalith Repos         mins 05271  Dry Needling          mins 60005/69462  Traction          mins 88317  Low Eval          Mins  72610  Mod Eval     25     Mins  89771  High Eval                            Mins  34583    No Charge:   Cryopack                         mins  Moist  Heat                       mins        Timed Treatment:   30   mins   Total Treatment:     55   mins        PT SIGNATURE: Misa Peña PT   [place] License: [id number]  DATE TREATMENT INITIATED: 8/21/2023    Initial Certification  Certification Period: 8/21/2023 thru 11/18/2023  I certify that the therapy services are furnished while this patient is under my care.  The services outlined above are required by this patient, and will be reviewed every 90 days.      Physician Signature: _________________________  PHYSICIAN: GERARD Gustafson DPM   NPI: [id number]                                             DATE: _____________________________________    Please sign and return via fax to [phone]. Thank you, [facility].

## 2023-08-25 ENCOUNTER — TREATMENT (OUTPATIENT)
Dept: PHYSICAL THERAPY | Facility: CLINIC | Age: 38
End: 2023-08-25
Payer: COMMERCIAL

## 2023-08-25 DIAGNOSIS — M72.2 PLANTAR FASCIITIS, BILATERAL: ICD-10-CM

## 2023-08-25 DIAGNOSIS — M21.6X2 ACQUIRED EQUINUS DEFORMITY OF BOTH FEET: ICD-10-CM

## 2023-08-25 DIAGNOSIS — M79.671 BILATERAL FOOT PAIN: Primary | ICD-10-CM

## 2023-08-25 DIAGNOSIS — M21.6X1 ACQUIRED EQUINUS DEFORMITY OF BOTH FEET: ICD-10-CM

## 2023-08-25 DIAGNOSIS — M79.672 BILATERAL FOOT PAIN: Primary | ICD-10-CM

## 2023-08-25 DIAGNOSIS — R26.2 DIFFICULTY WALKING: ICD-10-CM

## 2023-08-25 PROCEDURE — 97035 APP MDLTY 1+ULTRASOUND EA 15: CPT | Performed by: PHYSICAL THERAPIST

## 2023-08-25 PROCEDURE — 97140 MANUAL THERAPY 1/> REGIONS: CPT | Performed by: PHYSICAL THERAPIST

## 2023-08-25 PROCEDURE — 97110 THERAPEUTIC EXERCISES: CPT | Performed by: PHYSICAL THERAPIST

## 2023-08-25 NOTE — PROGRESS NOTES
Physical Therapy Daily Treatment Note  [address] [address]      Patient: Sienna Ayala  : 1985  Referring Practitioner: GERARD Gustafson DPM  Date of Initial Visit: Type: THERAPY  Noted: 2023  Today's Date: 2023  Patient seen for: 2 sessions      Visit Diagnoses:     ICD-10-CM ICD-9-CM   1. Bilateral foot pain  M79.671 729.5    M79.672    2. Acquired equinus deformity of both feet  M21.6X1 736.72    M21.6X2    3. Plantar fasciitis, bilateral  M72.2 728.71   4. Difficulty walking  R26.2 719.7       Subjective   Sienna Ayala reports: she did well following eval, pain persists at baseline level and increases depending on activity performed and duration of activity. Reports that following a shift at work, pain is 10/10.    Pain Level (0-10): 3    Objective     See Exercise, Manual, and Modality Logs for complete treatment.     Patient Education: purpose of US therapy for tissue warming and healing.    Assessment & Plan       Assessment  Assessment details: Sienna presents to PT today with reports of pain at stated baseline. Addition of US to KURT feet to allow for improved soft tissue extensibility for MFR and stretching. Good tolerance to all. Followed with review of current HEP. No additions for the weekend. Will progress as tolerated with standing activity.         Progress per Plan of Care and Progress strengthening /stabilization /functional activity          Timed:         Manual Therapy:    20     mins  11778;     Therapeutic Exercise:    15     mins  70609;     Neuromuscular Tony:        mins  05692;    Therapeutic Activity:          mins  43653;     Gait Training:           mins  03483;     Ultrasound:     12     mins  55283;    Ionto                                   mins   33291  Self Care                            mins   80572      Un-Timed:  Electrical Stimulation:         mins  60341 ( );  Traction          mins 71181    No Charge:    Cryopack:        mins  Hydrocollator MHP:         mins      Timed Treatment:   47   mins   Total Treatment:     47   mins      Germaine Gamez PTA  Physical Therapist Assistant  [place] License: [id number]

## 2023-08-30 ENCOUNTER — TREATMENT (OUTPATIENT)
Dept: PHYSICAL THERAPY | Facility: CLINIC | Age: 38
End: 2023-08-30
Payer: COMMERCIAL

## 2023-08-30 DIAGNOSIS — R26.2 DIFFICULTY WALKING: ICD-10-CM

## 2023-08-30 DIAGNOSIS — M21.6X2 ACQUIRED EQUINUS DEFORMITY OF BOTH FEET: ICD-10-CM

## 2023-08-30 DIAGNOSIS — M79.672 BILATERAL FOOT PAIN: Primary | ICD-10-CM

## 2023-08-30 DIAGNOSIS — M21.6X1 ACQUIRED EQUINUS DEFORMITY OF BOTH FEET: ICD-10-CM

## 2023-08-30 DIAGNOSIS — M79.671 BILATERAL FOOT PAIN: Primary | ICD-10-CM

## 2023-08-30 DIAGNOSIS — M72.2 PLANTAR FASCIITIS, BILATERAL: ICD-10-CM

## 2023-08-30 PROCEDURE — 97035 APP MDLTY 1+ULTRASOUND EA 15: CPT | Performed by: PHYSICAL THERAPIST

## 2023-08-30 PROCEDURE — 97140 MANUAL THERAPY 1/> REGIONS: CPT | Performed by: PHYSICAL THERAPIST

## 2023-08-30 PROCEDURE — 97110 THERAPEUTIC EXERCISES: CPT | Performed by: PHYSICAL THERAPIST

## 2023-08-30 NOTE — PROGRESS NOTES
"Physical Therapy Daily Treatment Note  [address] [place] [address]      Patient: Sienna Ayala  : 1985  Referring Practitioner: GERARD Gustafson DPM  Date of Initial Visit: Type: THERAPY  Noted: 2023  Today's Date: 2023  Patient seen for: 3 sessions      Visit Diagnoses:     ICD-10-CM ICD-9-CM   1. Bilateral foot pain  M79.671 729.5    M79.672    2. Plantar fasciitis, bilateral  M72.2 728.71   3. Acquired equinus deformity of both feet  M21.6X1 736.72    M21.6X2    4. Difficulty walking  R26.2 719.7       Subjective   Sienna Ayala reports that she felt really good following last session. States that she was able to take a \"bonus\" call in shift that night without any significant increase in pain. However following consecutive nights of working, pain does tend to increase. She reports that she is not wearing her inserts due to increased pain with wear, wonders if she should return to her old inserts since they are not as painful on her feet.     Pain Level (0-10): 4    Objective     See Exercise, Manual, and Modality Logs for complete treatment.     Patient Education: continuing with current plan, resume use of inserts with a \"weaning in\" period taking several days/weeks to acclimate to 12 hrs of continuous wear.     Assessment & Plan       Assessment  Assessment details: Resumed US and manual therapies due to subjective report of reduced pain following last session.         Progress per Plan of Care and Progress strengthening /stabilization /functional activity          Timed:         Manual Therapy:    16     mins  25925;     Therapeutic Exercise:    18     mins  23044;     Neuromuscular Tony:        mins  43779;    Therapeutic Activity:          mins  30662;     Gait Training:           mins  26442;     Ultrasound:     12     mins  92790;    Ionto                                   mins   86797  Self Care                            mins   " 21439      Un-Timed:  Electrical Stimulation:         mins  08416 ( );  Traction          mins 65846    No Charge:   Cryopack:        mins  Hydrocollator MHP:         mins      Timed Treatment:   46   mins   Total Treatment:     46   mins      Germaine Gamez PTA  Physical Therapist Assistant  [place] License: [id number]

## 2023-09-01 ENCOUNTER — TREATMENT (OUTPATIENT)
Dept: PHYSICAL THERAPY | Facility: CLINIC | Age: 38
End: 2023-09-01
Payer: COMMERCIAL

## 2023-09-01 DIAGNOSIS — M72.2 PLANTAR FASCIITIS, BILATERAL: ICD-10-CM

## 2023-09-01 DIAGNOSIS — M79.671 BILATERAL FOOT PAIN: Primary | ICD-10-CM

## 2023-09-01 DIAGNOSIS — R26.2 DIFFICULTY WALKING: ICD-10-CM

## 2023-09-01 DIAGNOSIS — M21.6X2 ACQUIRED EQUINUS DEFORMITY OF BOTH FEET: ICD-10-CM

## 2023-09-01 DIAGNOSIS — M21.6X1 ACQUIRED EQUINUS DEFORMITY OF BOTH FEET: ICD-10-CM

## 2023-09-01 DIAGNOSIS — M79.672 BILATERAL FOOT PAIN: Primary | ICD-10-CM

## 2023-09-01 PROCEDURE — 97035 APP MDLTY 1+ULTRASOUND EA 15: CPT | Performed by: PHYSICAL THERAPIST

## 2023-09-01 PROCEDURE — 97140 MANUAL THERAPY 1/> REGIONS: CPT | Performed by: PHYSICAL THERAPIST

## 2023-09-01 PROCEDURE — 97112 NEUROMUSCULAR REEDUCATION: CPT | Performed by: PHYSICAL THERAPIST

## 2023-09-01 PROCEDURE — 97110 THERAPEUTIC EXERCISES: CPT | Performed by: PHYSICAL THERAPIST

## 2023-09-01 NOTE — PROGRESS NOTES
Physical Therapy Daily Treatment Note  [address] [address]      Patient: Sienna Ayala  : 1985  Referring Practitioner: GERARD Gustafson DPM  Date of Initial Visit: Type: THERAPY  Noted: 2023  Today's Date: 2023  Patient seen for: 4 sessions      Visit Diagnoses:     ICD-10-CM ICD-9-CM   1. Bilateral foot pain  M79.671 729.5    M79.672    2. Plantar fasciitis, bilateral  M72.2 728.71   3. Acquired equinus deformity of both feet  M21.6X1 736.72    M21.6X2    4. Difficulty walking  R26.2 719.7       Subjective   Sienna Ayala reports that she feels as if her feet are feeling a little better. States she did not wear her inserts for her shift as she was fearful that she wouldn't be able to remove if they started causing her pain. She did wear them for some time around her home without issue.     Pain Level (0-10): 4, this morning. Up to 6/10 during work.     Objective     See Exercise, Manual, and Modality Logs for complete treatment.     Patient Education: cues for new exs.     Assessment & Plan       Assessment  Assessment details: Resumed US and manual therapies due to continued reports of overall improvement in symptoms. Progressed with standing activities today. Some increased discomfort when R LE is positioned posteriorly during static balance activity, but overall no increase in pain.         Progress per Plan of Care and Progress strengthening /stabilization /functional activity          Timed:         Manual Therapy:    16     mins  56856;     Therapeutic Exercise:    18     mins  61434;     Neuromuscular Tony:    8     mins  50444;    Therapeutic Activity:          mins  79803;     Gait Training:           mins  89314;     Ultrasound:     12     mins  88898;    Ionto                                   mins   29420  Self Care                            mins   69133      Un-Timed:  Electrical Stimulation:         mins  79049 ( );  Traction           mins 27032    No Charge:   Cryopack:        mins  Hydrocollator MHP:         mins      Timed Treatment:   54   mins   Total Treatment:     54   mins      Germaine Gamez PTA  Physical Therapist Assistant  [place] License: [id number]

## 2023-09-06 ENCOUNTER — TREATMENT (OUTPATIENT)
Dept: PHYSICAL THERAPY | Facility: CLINIC | Age: 38
End: 2023-09-06
Payer: COMMERCIAL

## 2023-09-06 DIAGNOSIS — M21.6X1 ACQUIRED EQUINUS DEFORMITY OF BOTH FEET: ICD-10-CM

## 2023-09-06 DIAGNOSIS — M72.2 PLANTAR FASCIITIS, BILATERAL: ICD-10-CM

## 2023-09-06 DIAGNOSIS — R26.2 DIFFICULTY WALKING: ICD-10-CM

## 2023-09-06 DIAGNOSIS — M79.672 BILATERAL FOOT PAIN: Primary | ICD-10-CM

## 2023-09-06 DIAGNOSIS — M21.6X2 ACQUIRED EQUINUS DEFORMITY OF BOTH FEET: ICD-10-CM

## 2023-09-06 DIAGNOSIS — M79.671 BILATERAL FOOT PAIN: Primary | ICD-10-CM

## 2023-09-06 PROCEDURE — 97110 THERAPEUTIC EXERCISES: CPT | Performed by: PHYSICAL THERAPIST

## 2023-09-06 PROCEDURE — 97140 MANUAL THERAPY 1/> REGIONS: CPT | Performed by: PHYSICAL THERAPIST

## 2023-09-06 PROCEDURE — 97035 APP MDLTY 1+ULTRASOUND EA 15: CPT | Performed by: PHYSICAL THERAPIST

## 2023-09-06 NOTE — PROGRESS NOTES
Physical Therapy Daily Treatment Note  [address] [facility] [place] [address]      Patient: Sienna Ayala  : 1985  Referring Practitioner: GERARD Gustafson DPM  Date of Initial Visit: Type: THERAPY  Noted: 2023  Today's Date: 2023  Patient seen for: 5 sessions      Visit Diagnoses:     ICD-10-CM ICD-9-CM   1. Bilateral foot pain  M79.671 729.5    M79.672    2. Plantar fasciitis, bilateral  M72.2 728.71   3. Acquired equinus deformity of both feet  M21.6X1 736.72    M21.6X2    4. 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 Reports pain as high as 6-7 during those times, but  once she is able to rest, pain reduces to 4/10. Following every session she has reported pain as low as 2/10.    Pain Level (0-10): 4, this morning. Up to 6/10 during work.     Objective     See Exercise, Manual, and Modality Logs for complete treatment.     Patient Education: review of standing  gastoc and PF stretching to  perform during working hours.     Assessment & Plan       Assessment  Assessment details: US and manual therapies continue to provide relief of pain and dicomfort, thus continued with both today. No additions, but did review standing stretches for pt to perform during work to alleviate tension/pain. Increased time with static standing challenge, that pt reports is most challenging and uncomfortable. Continues to report reduced pain at end of session.        Progress per Plan of Care and Progress strengthening /stabilization /functional activity          Timed:         Manual Therapy:    12     mins  45684;     Therapeutic Exercise:    18     mins  29171;     Neuromuscular Tony:    5     mins  42356;    Therapeutic Activity:          mins  16898;     Gait Training:           mins  97369;     Ultrasound:     12     mins  08573;     Ionto                                   mins   63870  Self Care                            mins   47830      Un-Timed:  Electrical Stimulation:         mins  35100 ( );  Traction          mins 06797    No Charge:   Cryopack:        mins  Hydrocollator MHP:         mins      Timed Treatment:   47   mins   Total Treatment:     47   mins      Germaine Gamez PTA  Physical Therapist Assistant  [place] License: [id number]

## 2023-09-08 ENCOUNTER — TREATMENT (OUTPATIENT)
Dept: PHYSICAL THERAPY | Facility: CLINIC | Age: 38
End: 2023-09-08
Payer: COMMERCIAL

## 2023-09-08 DIAGNOSIS — M72.2 PLANTAR FASCIITIS, BILATERAL: ICD-10-CM

## 2023-09-08 DIAGNOSIS — M79.672 BILATERAL FOOT PAIN: Primary | ICD-10-CM

## 2023-09-08 DIAGNOSIS — M79.671 BILATERAL FOOT PAIN: Primary | ICD-10-CM

## 2023-09-08 DIAGNOSIS — M21.6X1 ACQUIRED EQUINUS DEFORMITY OF BOTH FEET: ICD-10-CM

## 2023-09-08 DIAGNOSIS — R26.2 DIFFICULTY WALKING: ICD-10-CM

## 2023-09-08 DIAGNOSIS — M21.6X2 ACQUIRED EQUINUS DEFORMITY OF BOTH FEET: ICD-10-CM

## 2023-09-08 PROCEDURE — 97110 THERAPEUTIC EXERCISES: CPT | Performed by: PHYSICAL THERAPIST

## 2023-09-08 PROCEDURE — 97035 APP MDLTY 1+ULTRASOUND EA 15: CPT | Performed by: PHYSICAL THERAPIST

## 2023-09-08 PROCEDURE — 97140 MANUAL THERAPY 1/> REGIONS: CPT | Performed by: PHYSICAL THERAPIST

## 2023-09-08 NOTE — PROGRESS NOTES
Physical Therapy Daily Treatment Note  [address] [address]    Patient: Sienna Ayala  : 1985  Referring practitioner: GERARD Gustafson DPM  Date of Initial Visit: Type: THERAPY  Noted: 2023  Today's Date: 2023  Patient seen for 6 sessions      Visit Diagnoses:    ICD-10-CM ICD-9-CM   1. Bilateral foot pain  M79.671 729.5    M79.672    2. Plantar fasciitis, bilateral  M72.2 728.71   3. Acquired equinus deformity of both feet  M21.6X1 736.72    M21.6X2    4. Difficulty walking  R26.2 719.7       VISIT#: 6    Subjective   Sienna Ayala reports pain on plantar aspect of foot. She reports that her pain is worse when she is standing.   Pain Rating (0-10): 3/10 B lat feet, more on R this morning. Up to 6/10 during work.    Objective     See Exercise, Manual, and Modality Logs for complete treatment.     Patient Education: continue current HEP.     Assessment & Plan       Assessment  Assessment details: Patient presents with decreased pain since starting physical therapy and has met two of her short term goals. Retro tandem walking was introduced into her exercise program and she demonstrated some instability issues, but was able to prevent any falls. Close standby assist was provided to the patient for safety.     Goals  Plan Goals: Plan Goals: STG's: 3 weeks   Patient will report pain level at worse </= 7/10 for improved tolerance to ADLs and functional mobility. - MET  Patient will require <3 tactile or verbal cues for proper mechanics during completion of her HEP  - MET     LTG's: By Discharge  Patient will report pain levels at worst </= 5/10 for improved tolerance to ADLs and functional mobility  Patient will be able to ambulate unlimited distances without an assistive device and no increased pain  Patient will be able to complete single leg stance on stable surface with no UE support, with supervision for durations > 15 seconds on bilat LE to decrease risk  of falls  Patient will report >75% improvement in her ability to tolerate standing for durations > 20 minutes per reduction in her symptoms   Patient will report improved levels of overall function as demonstrated by an increase in her reported level of function score on the LEFS to >/= 55/80    Patient will report improvement in their tolerance to sleeping and report waking up </= 1x/night per positional discomfort  Patient will require no tactile or verbal cues for proper mechaics during completion of her HEP for final independence        Progress per Plan of Care and Progress strengthening /stabilization /functional activity           I was present in the room guiding the student, directing the service, and making the skilled judgement for all services rendered.      Timed:         Manual Therapy:    12     mins  69137;     Therapeutic Exercise:    18     mins  58606;     Neuromuscular Tony:   5     mins  07415;    Therapeutic Activity:          mins  52422;     Gait Training:           mins  36110;     Ultrasound:     12     mins  11078;    Ionto                                   mins   91585  Self Care                            mins   01499    Un-Timed:  Electrical Stimulation:         mins  11522 ( );  Canalith Repos         mins 76761  Dry Needling          mins 56845/84970  Traction          mins 96243  Re-Eval                               mins  38383    No Charge:   Cryopack                         mins  Moist Heat                       mins    Timed Treatment:   47   mins   Total Treatment:     47   mins        Yudy Coates, Student Physical Therapist        Misa Peña PT  Physical Therapist  [place] License: [id number]

## 2023-09-11 ENCOUNTER — LAB (OUTPATIENT)
Dept: LAB | Facility: HOSPITAL | Age: 38
End: 2023-09-11
Payer: COMMERCIAL

## 2023-09-11 ENCOUNTER — TREATMENT (OUTPATIENT)
Dept: PHYSICAL THERAPY | Facility: CLINIC | Age: 38
End: 2023-09-11
Payer: COMMERCIAL

## 2023-09-11 DIAGNOSIS — M21.6X1 ACQUIRED EQUINUS DEFORMITY OF BOTH FEET: ICD-10-CM

## 2023-09-11 DIAGNOSIS — M21.6X2 ACQUIRED EQUINUS DEFORMITY OF BOTH FEET: ICD-10-CM

## 2023-09-11 DIAGNOSIS — M72.2 PLANTAR FASCIITIS, BILATERAL: ICD-10-CM

## 2023-09-11 DIAGNOSIS — M79.671 BILATERAL FOOT PAIN: Primary | ICD-10-CM

## 2023-09-11 DIAGNOSIS — R26.2 DIFFICULTY WALKING: ICD-10-CM

## 2023-09-11 DIAGNOSIS — M79.672 BILATERAL FOOT PAIN: Primary | ICD-10-CM

## 2023-09-11 DIAGNOSIS — E11.65 TYPE 2 DIABETES MELLITUS WITH HYPERGLYCEMIA, WITHOUT LONG-TERM CURRENT USE OF INSULIN: ICD-10-CM

## 2023-09-11 LAB — HBA1C MFR BLD: 6.2 % (ref 4.8–5.6)

## 2023-09-11 PROCEDURE — 97112 NEUROMUSCULAR REEDUCATION: CPT | Performed by: PHYSICAL THERAPIST

## 2023-09-11 PROCEDURE — 97110 THERAPEUTIC EXERCISES: CPT | Performed by: PHYSICAL THERAPIST

## 2023-09-11 PROCEDURE — 36415 COLL VENOUS BLD VENIPUNCTURE: CPT

## 2023-09-11 PROCEDURE — 97140 MANUAL THERAPY 1/> REGIONS: CPT | Performed by: PHYSICAL THERAPIST

## 2023-09-11 PROCEDURE — 83036 HEMOGLOBIN GLYCOSYLATED A1C: CPT

## 2023-09-11 PROCEDURE — 97035 APP MDLTY 1+ULTRASOUND EA 15: CPT | Performed by: PHYSICAL THERAPIST

## 2023-09-11 NOTE — PROGRESS NOTES
Physical Therapy Daily Treatment Note  [address] [address]      Patient: Sienna Ayala  : 1985  Referring Practitioner: GERARD Gustafson DPM  Date of Initial Visit: Type: THERAPY  Noted: 2023  Today's Date: 2023  Patient seen for: 7 sessions      Visit Diagnoses:     ICD-10-CM ICD-9-CM   1. Bilateral foot pain  M79.671 729.5    M79.672    2. Plantar fasciitis, bilateral  M72.2 728.71   3. Difficulty walking  R26.2 719.7   4. Acquired equinus deformity of both feet  M21.6X1 736.72    M21.6X2        Subjective   Sienna Ayala reports she did well following last session, states she did work Friday night and Saturday night. States that Friday night was very painful, because she was on her feet a lot. Saturday wasn't as bad, but still very painful. She does report that she took some time to stretch throughout her shifts. Today she reports pain at 8/10 on walking into the clinic.     Pain Level (0-10): 8    Objective     See Exercise, Manual, and Modality Logs for complete treatment.     Patient Education: continuing current plan, progressing to more challenging standing activities in coming sessions.     Assessment & Plan       Assessment  Assessment details: Continued with current plan of US, Manual and therex to facilitate increase mobility and alleviate pain. Pt reports improvement in overall symptoms at end of session, but does not give new pain rating. Plan to progress with more standing activities at coming sessions.         Progress per Plan of Care and Progress strengthening /stabilization /functional activity          Timed:         Manual Therapy:    14     mins  71285;     Therapeutic Exercise:    20     mins  75088;     Neuromuscular Tony:    8     mins  61544;    Therapeutic Activity:          mins  72447;     Gait Training:           mins  37882;     Ultrasound:     12     mins  14682;    Ionto                                   mins    10606  Self Care                            mins   29720      Un-Timed:  Electrical Stimulation:         mins  39651 ( );  Traction          mins 82715    No Charge:   Cryopack:        mins  Hydrocollator MHP:         mins      Timed Treatment:   54   mins   Total Treatment:     54   mins      Germaine Gamez PTA  Physical Therapist Assistant  [place] License: [id number]

## 2023-09-14 ENCOUNTER — OFFICE VISIT (OUTPATIENT)
Dept: PODIATRY | Facility: CLINIC | Age: 38
End: 2023-09-14
Payer: COMMERCIAL

## 2023-09-14 VITALS — WEIGHT: 293 LBS | HEART RATE: 91 BPM | OXYGEN SATURATION: 100 % | HEIGHT: 64 IN | BODY MASS INDEX: 50.02 KG/M2

## 2023-09-14 DIAGNOSIS — M79.671 BILATERAL FOOT PAIN: Primary | ICD-10-CM

## 2023-09-14 DIAGNOSIS — M21.6X1 ACQUIRED EQUINUS DEFORMITY OF BOTH FEET: ICD-10-CM

## 2023-09-14 DIAGNOSIS — M79.672 BILATERAL FOOT PAIN: Primary | ICD-10-CM

## 2023-09-14 DIAGNOSIS — E66.01 MORBID OBESITY WITH BMI OF 50.0-59.9, ADULT: ICD-10-CM

## 2023-09-14 DIAGNOSIS — M72.2 PLANTAR FASCIITIS, BILATERAL: ICD-10-CM

## 2023-09-14 DIAGNOSIS — M21.6X2 ACQUIRED EQUINUS DEFORMITY OF BOTH FEET: ICD-10-CM

## 2023-09-14 DIAGNOSIS — E11.65 TYPE 2 DIABETES MELLITUS WITH HYPERGLYCEMIA, WITHOUT LONG-TERM CURRENT USE OF INSULIN: ICD-10-CM

## 2023-09-14 RX ORDER — TIRZEPATIDE 7.5 MG/.5ML
INJECTION, SOLUTION SUBCUTANEOUS
COMMUNITY

## 2023-09-14 RX ADMIN — TRIAMCINOLONE ACETONIDE 40 MG: 40 INJECTION, SUSPENSION INTRA-ARTICULAR; INTRAMUSCULAR at 08:13

## 2023-09-14 RX ADMIN — TRIAMCINOLONE ACETONIDE 20 MG: 40 INJECTION, SUSPENSION INTRA-ARTICULAR; INTRAMUSCULAR at 08:12

## 2023-09-14 NOTE — PROGRESS NOTES
09/14/2023    Plantar Fascia Steroid Injection: Bilateral heels    Consent and time out was performed before proceeding with the procedure. The area of maximal tenderness was palpated near the plantar medial calcaneal tuberosity of bilateral heels. The area was cleansed with alcohol. Under ultrasound guidance, the plantar fascia was visualized and a solution totaling 1.5 mL was injected about the origin of the plantar fascia. The solution contained 0.5 mL of 1% lidocaine plain, 0.5 mL of 0.5% Marcaine plain, and 0.5 mL of Kenalog. After the injection, the patient noted immediate pain relief. Mild compression was placed at the injection site followed by a sterile bandage. The patient tolerated the injection well without complication.    Transcribed from ambient dictation for GERARD Gustafson DPM by Shahana Saha.  09/14/23   10:05 EDT    Patient or patient representative verbalized consent to the visit recording.  I have personally performed the services described in this document as transcribed by the above individual, and it is both accurate and complete.

## 2023-09-14 NOTE — PROGRESS NOTES
09/14/2023  Foot and Ankle Surgery - Established Patient/Follow-up  Provider: Dr. Chapo Gustafson DPM  Location: [facility]    Subjective:  Sienna Ayala is a 38 y.o. female.     Chief Complaint   Patient presents with    Right Foot - Follow-up, Plantar Fasciitis    Left Foot - Follow-up, Plantar Fasciitis    Follow-up     MIHAI Coelho MD 06/2023       HPI: The patient is a 38-year-old female who returns to the clinic for a follow-up regarding bilateral heel pain. She is accompanied by her .    The patient was last seen approximately 1 month ago. She reports approximately 10 percent improvement in her pain since her last visit, but she continues to experience bilateral heel pain, right greater than left. She last received a steroid injection to her right foot only in 06/2023, which was not significantly beneficial. Her  reports it appears the patient's symptoms dissipate then return worse after working. He adds that the patient spent 3 days working and spent another 2 days almost doing nothing as she was unable to ambulate. The patient reports a history of a toe-walking gait until she was 11 years old. She notes she has tried less strenuous jobs, but her symptoms persisted.    The patient states is scheduled to see her primary care physician on 09/28/2023. She reports her A1c has decreased from 10.8 % to 6.2 %.    Allergies   Allergen Reactions    Lisinopril Other (See Comments)     Patient states that she experiences heart palpations     Penicillins Hives     Patient states that she gets hives     Promethazine Nausea And Vomiting     Patient states that she experiences increased vomiting        Current Outpatient Medications on File Prior to Visit   Medication Sig Dispense Refill    albuterol sulfate  (90 Base) MCG/ACT inhaler       Bismuth Subsalicylate (Pepto-Bismol) 262 MG tablet       buPROPion SR (WELLBUTRIN SR) 150 MG 12 hr tablet Every 12 (Twelve) Hours.      busPIRone (BUSPAR)  10 MG tablet Take 1 tablet by mouth 2 (Two) Times a Day.      clonazePAM (KlonoPIN) 0.5 MG tablet Take 1 tablet by mouth 2 (Two) Times a Day As Needed.      Continuous Blood Gluc  (Dexcom G6 ) device See Admin Instructions.      Continuous Blood Gluc Sensor (Dexcom G6 Sensor) See Admin Instructions.      ergocalciferol (ERGOCALCIFEROL) 1.25 MG (42298 UT) capsule Take 1 capsule by mouth 1 (One) Time Per Week.      fenofibrate (TRICOR) 145 MG tablet Take 1 tablet by mouth Daily.      gabapentin (NEURONTIN) 300 MG capsule Take 1 capsule by mouth 3 (Three) Times a Day.      Glucagon (Baqsimi One Pack) 3 MG/DOSE powder 3 mg into the nostril(s) as directed by provider As Needed (For emergency use for severe low glucose: follow directions for use then call 911.). 1 each 6    HumaLOG KwikPen 100 UNIT/ML solution pen-injector Use as directed for meal & correction coverage up to 50 units a day. 45 mL 2    insulin glargine (LANTUS, SEMGLEE) 100 UNIT/ML injection Inject 30 Units under the skin into the appropriate area as directed Every Morning.      Lancets (OneTouch Delica Plus [id number]) misc 1 each 5 (Five) Times a Day. on insulin tx, poor control, hypoglycemia. Dx: E 11.65 500 each 4    lisdexamfetamine (VYVANSE) 50 MG capsule Take 1 capsule by mouth Every Morning      losartan (COZAAR) 50 MG tablet Take 1 tablet by mouth Daily.      magnesium oxide (MAG-OX) 400 MG tablet Take 1 tablet by mouth Daily.      metoprolol succinate XL (TOPROL-XL) 100 MG 24 hr tablet metoprolol succinate  mg tablet,extended release 24 hr   TAKE 1 TABLET BY MOUTH EVERY DAY      modafinil (PROVIGIL) 200 MG tablet Take 1 tablet by mouth Daily.      Mounjaro 7.5 MG/0.5ML solution pen-injector Inject 7.5 mg every week by subcutaneous route.      omeprazole (prilOSEC) 10 MG capsule       ondansetron ODT (ZOFRAN-ODT) 8 MG disintegrating tablet Place 1 tablet on the tongue Every 8 (Eight) Hours As Needed.      oxybutynin XL  "(DITROPAN XL) 15 MG 24 hr tablet Take 1 tablet by mouth Daily.      pioglitazone (Actos) 30 MG tablet By mouth take one tab daily. (Patient taking differently: 1.5 tablets. By mouth take one tab daily.) 90 tablet 2    SUMAtriptan (IMITREX) 100 MG tablet Take 1 tablet by mouth As Needed.       No current facility-administered medications on file prior to visit.       Objective   Pulse 91   Ht 162.6 cm (64\")   Wt (!) 155 kg (342 lb)   SpO2 100%   BMI 58.70 kg/m²     Foot/Ankle Exam  GENERAL  Appearance:  obese  Orientation:  AAOx3  Affect:  appropriate     VASCULAR      Right Foot Vascularity   Normal vascular exam    Dorsalis pedis:  2+  Posterior tibial:  2+  Skin temperature:  warm  Edema grading:  None  CFT:  < 3 seconds  Pedal hair growth:  Present  Varicosities:  none      Left Foot Vascularity   Normal vascular exam    Dorsalis pedis:  2+  Posterior tibial:  2+  Skin temperature:  warm  Edema grading:  None  CFT:  < 3 seconds  Pedal hair growth:  Present  Varicosities:  none     NEUROLOGIC      Right Foot Neurologic   Light touch sensation: normal  Hot/Cold sensation: normal  Achilles reflex:  2+      Left Foot Neurologic   Light touch sensation: normal  Hot/Cold sensation:  normal  Achilles reflex:  2+     MUSCULOSKELETAL      Right Foot Musculoskeletal   Arch:  Normal      Left Foot Musculoskeletal   Arch:  Normal     MUSCLE STRENGTH      Right Foot Muscle Strength   Normal strength    Foot dorsiflexion:  5  Foot plantar flexion:  5  Foot inversion:  5  Foot eversion:  5      Left Foot Muscle Strength   Normal strength    Foot dorsiflexion:  5  Foot plantar flexion:  5  Foot inversion:  5  Foot eversion:  5     DERMATOLOGIC       Right Foot Dermatologic   Skin  Right foot skin is intact.       Left Foot Dermatologic   Skin  Left foot skin is intact.      TESTS      Right Foot Tests   Anterior drawer: negative  Varus tilt: negative      Left Foot Tests   Anterior drawer: negative  Varus tilt: negative   "   Right foot additional comments:  Moderate equinus contracture with knee extended and flexed. Discomfort with palpation involving the plantar medial calcaneal tuberosity. No obvious deformity or instability.   Mild erythema and maceration involving the right hallux nail bed consistent with previous total nail avulsion with chemical matrixectomy.     Left foot additional comments:  Moderate equinus contracture with knee extended and flexed. Discomfort with palpation involving the plantar medial calcaneal tuberosity. No obvious deformity or instability.      08/14/2023: Continued pain with palpation involving the plantar aspect of the right heel. Discomfort with palpation involving the ankle and perimalleolar region.    Assessment & Plan   Diagnoses and all orders for this visit:    1. Bilateral foot pain (Primary)    2. Plantar fasciitis, bilateral  -     Injection Tendon or Ligament  -     triamcinolone acetonide (KENALOG-40) injection 20 mg  -     triamcinolone acetonide (KENALOG-40) injection 40 mg    3. Acquired equinus deformity of both feet    4. Type 2 diabetes mellitus with hyperglycemia, without long-term current use of insulin    5. Morbid obesity with BMI of 50.0-59.9, adult        The patient returns to the clinic for a follow-up regarding bilateral foot pain. She reports 10 % improvement with continued pain with palpation involving the plantar aspect of bilateral heels, right greater than left. There is continued equinus contracture with knee extended and flexed. Her A1c has decreased from 10.8 % to 6.2 % which increases treatment options to now include steroid injections. Advised the steroid injections will likely increase her blood glucose, but it will be transient. Discussed if her symptoms continue to persist or worsen, she may need to consider surgical intervention. Advised, with her history of a toe-walking gait, she has exceptional tightness in her lower extremity posteriorly and surgery will  likely be the most definitive treatment option. There is now less of a concern for infection and wound healing complications given that her blood glucose is now well controlled. A gastrocnemius recession, risks, goals, and recovery were discussed in detail with the patient. Explained surgery will be performed on one lower extremity at a time with full recovery before proceeding with the contralateral lower extremity. Surgery will be an outpatient procedure. Recovery will consist of 2 weeks of strict non-weightbearing with a knee scooter or wheelchair with decreased activity. Subsequently, she will be placed into a boot for 2 to 4 weeks, then she will attempt to transition back into a regular shoe with normal activity. Advised she will be off work for 2 months at the very least. The patient would like to proceed with bilateral heel injections today and to possibly consider surgery in the future. She will follow up in 6 weeks for re-evaluation. Greater than 30 minutes was spent before, during, and after evaluation for patient care.     Orders Placed This Encounter   Procedures    Injection Tendon or Ligament     Order Specific Question:   Release to patient     Answer:   Routine Release [[id number]]          Note is dictated utilizing voice recognition software. Unfortunately this leads to occasional typographical errors. I apologize in advance if the situation occurs. If questions occur please do not hesitate to call our office.    Transcribed from ambient dictation for GERARD Gustafson DPM by Shahana Saha.  09/14/23   10:03 EDT    Patient or patient representative verbalized consent to the visit recording.  I have personally performed the services described in this document as transcribed by the above individual, and it is both accurate and complete.

## 2023-09-15 RX ORDER — TRIAMCINOLONE ACETONIDE 40 MG/ML
20 INJECTION, SUSPENSION INTRA-ARTICULAR; INTRAMUSCULAR ONCE
Status: COMPLETED | OUTPATIENT
Start: 2023-09-15 | End: 2023-09-14

## 2023-09-15 RX ORDER — TRIAMCINOLONE ACETONIDE 40 MG/ML
40 INJECTION, SUSPENSION INTRA-ARTICULAR; INTRAMUSCULAR ONCE
Status: COMPLETED | OUTPATIENT
Start: 2023-09-15 | End: 2023-09-14

## 2023-10-11 ENCOUNTER — DOCUMENTATION (OUTPATIENT)
Dept: PHYSICAL THERAPY | Facility: CLINIC | Age: 38
End: 2023-10-11
Payer: COMMERCIAL

## 2023-10-11 NOTE — PROGRESS NOTES
Discharge Summary  Discharge Summary from Physical Therapy Report      Dates  PT visit: 8/21/23-9/11/23  Number of Visits: 7     Discharge Status of Patient: Patient did not return for ongoing care so their chart will be discharged at this time.      Goals: Partially Met    Discharge Plan: Continue with current home exercise program as instructed  Patient to return to referring/providing physician      Date of Discharge 10/11/23        Misa Peña, PT  Physical Therapist